# Patient Record
Sex: FEMALE | Race: WHITE | NOT HISPANIC OR LATINO | Employment: FULL TIME | URBAN - METROPOLITAN AREA
[De-identification: names, ages, dates, MRNs, and addresses within clinical notes are randomized per-mention and may not be internally consistent; named-entity substitution may affect disease eponyms.]

---

## 2019-05-29 ENCOUNTER — OFFICE VISIT (OUTPATIENT)
Dept: FAMILY MEDICINE CLINIC | Facility: CLINIC | Age: 44
End: 2019-05-29
Payer: COMMERCIAL

## 2019-05-29 VITALS
BODY MASS INDEX: 28.85 KG/M2 | OXYGEN SATURATION: 97 % | WEIGHT: 169 LBS | DIASTOLIC BLOOD PRESSURE: 72 MMHG | HEIGHT: 64 IN | HEART RATE: 80 BPM | SYSTOLIC BLOOD PRESSURE: 124 MMHG

## 2019-05-29 DIAGNOSIS — I82.402 DEEP VEIN THROMBOSIS (DVT) OF LEFT LOWER EXTREMITY, UNSPECIFIED CHRONICITY, UNSPECIFIED VEIN (HCC): ICD-10-CM

## 2019-05-29 DIAGNOSIS — Z12.31 ENCOUNTER FOR SCREENING MAMMOGRAM FOR BREAST CANCER: Primary | ICD-10-CM

## 2019-05-29 PROCEDURE — 99203 OFFICE O/P NEW LOW 30 MIN: CPT | Performed by: NURSE PRACTITIONER

## 2019-05-29 PROCEDURE — 3008F BODY MASS INDEX DOCD: CPT | Performed by: NURSE PRACTITIONER

## 2019-06-11 ENCOUNTER — TELEPHONE (OUTPATIENT)
Dept: FAMILY MEDICINE CLINIC | Facility: CLINIC | Age: 44
End: 2019-06-11

## 2019-06-11 DIAGNOSIS — Z12.31 ENCOUNTER FOR SCREENING MAMMOGRAM FOR BREAST CANCER: ICD-10-CM

## 2019-06-11 DIAGNOSIS — N63.0 BREAST MASS: Primary | ICD-10-CM

## 2019-06-12 DIAGNOSIS — N63.0 BREAST MASS: ICD-10-CM

## 2019-07-02 ENCOUNTER — TELEPHONE (OUTPATIENT)
Dept: FAMILY MEDICINE CLINIC | Facility: CLINIC | Age: 44
End: 2019-07-02

## 2019-07-02 NOTE — TELEPHONE ENCOUNTER
Patient is waiting to hear from [facility]    Naa Raissa just received disk of images   Spoke to Diana she is calling patient to set up appointments for what is needed as follow up

## 2019-07-03 ENCOUNTER — HOSPITAL ENCOUNTER (OUTPATIENT)
Dept: MAMMOGRAPHY | Facility: CLINIC | Age: 44
Discharge: HOME | End: 2019-07-03

## 2019-07-03 ENCOUNTER — TELEPHONE (OUTPATIENT)
Dept: FAMILY MEDICINE CLINIC | Facility: CLINIC | Age: 44
End: 2019-07-03

## 2019-07-03 DIAGNOSIS — Z76.89 REFERRAL OF PATIENT WITHOUT EXAMINATION OR TREATMENT: ICD-10-CM

## 2019-07-03 NOTE — TELEPHONE ENCOUNTER
Outside films were looked at by our regional breast      They placed a note in the images tab for you  We are to let them know how you wish to proceed with the testing   They have not yet called the patient until you see the updated report    Thank you

## 2019-07-03 NOTE — TELEPHONE ENCOUNTER
There are two different reports - previous one it seems states to have a biopsy and the other states to recheck in 6 months - possible cystic lesions  We can ask her what she would like to do - I would recommend a biopsy to err on the side of caution as there are two conflicting options and I have only seen her once for an ED follow-up

## 2019-07-03 NOTE — PROGRESS NOTES
Spoke with pt regarding an outside review recommendation of Dr Timbo Pradhan  for cyst aspiration of right breast with potential for biopsy if needed  __x___ RIGHT ______LEFT      _____Ultrasound guided  ______Stereotactic  Breast biopsy ___x____Cyst Aspiration      __x___Verbalized understanding        Blood thinners:  __x___yes _____no  [place])    Date stopped: ___n/a________    Biopsy teaching sheet reviewed with pt via telephone:  ___x____yes ______no

## 2019-07-11 ENCOUNTER — HOSPITAL ENCOUNTER (OUTPATIENT)
Dept: ULTRASOUND IMAGING | Facility: CLINIC | Age: 44
Discharge: HOME | End: 2019-07-11
Payer: COMMERCIAL

## 2019-07-11 VITALS
HEART RATE: 72 BPM | SYSTOLIC BLOOD PRESSURE: 122 MMHG | BODY MASS INDEX: 28.85 KG/M2 | WEIGHT: 169 LBS | DIASTOLIC BLOOD PRESSURE: 76 MMHG | HEIGHT: 64 IN

## 2019-07-11 DIAGNOSIS — R92.8 ABNORMAL ULTRASOUND OF BREAST: ICD-10-CM

## 2019-07-11 PROCEDURE — 10160 PNXR ASPIR ABSC HMTMA BULLA: CPT

## 2019-07-11 PROCEDURE — 76942 ECHO GUIDE FOR BIOPSY: CPT

## 2019-07-11 RX ORDER — LIDOCAINE HYDROCHLORIDE 10 MG/ML
4 INJECTION, SOLUTION EPIDURAL; INFILTRATION; INTRACAUDAL; PERINEURAL ONCE
Status: COMPLETED | OUTPATIENT
Start: 2019-07-11 | End: 2019-07-11

## 2019-07-11 RX ADMIN — LIDOCAINE HYDROCHLORIDE 4 ML: 10 INJECTION, SOLUTION EPIDURAL; INFILTRATION; INTRACAUDAL; PERINEURAL at 10:59

## 2019-07-11 NOTE — DISCHARGE INSTR - OTHER ORDERS
POST LARGE CORE BREAST BIOPSY PATIENT INFORMATION      1  Place an ice pack inside your bra over the top of the dressing every hour for 20 minutes (20 minutes on, 60 minutes off)  Do this until bedtime  2  Do not shower or bathe until the following morning  3  You may bathe your breast carefully with the steri-strips in place  Be careful    Not to loosen them  The steri-strips will fall off in 3-5 days  4  You may have mild discomfort, and you may have some bruising where the   Needle entered the skin  This should clear within 5-7 days  5  If you need medicine for discomfort, take acetaminophen products such as   Tylenol  You may also take Advil or Motrin products  6  Do not participate in strenuous activities such as-tennis, aerobics, skiing,  Weight lifting, etc  for 24 hours  Refrain from swimming/soaking for 72 hours  7  Wearing a bra for sleeping may be more comfortable for the first 24-48 hours  8  Watch for continued bleeding, pain or fever over 101; please call with any questions or concerns  For procedures done at the [facility]  [address] call:  Praveen Manrique RN at [phone]  Erenest Curling RN at [phone]                    *After 4 PM call the Interventional Radiology Department                    [phone] and ask to speak with the nurse on call  For procedures done at the [address] [place] [address] call:         Zachary Spatz RN at   *After 4 PM call the Interventional Radiology Department   [phone] and ask to speak with the nurse on call  For procedures done at [address] [place] [address] call: The Radiology Nurse at [phone]  *After 4 PM call your physician, or go to the Emergency Department  9          The final results of your biopsy are usually available within one week

## 2019-07-11 NOTE — PROGRESS NOTES
Procedure type:    ____x_ultrasound guided _____stereotactic    Breast:    _____Left ___x__Right    Location:[address]    Needle:18ga Precision    # of passes: 1    Clip: N/A    Performed by: Dr Alberto Frederick held for 5 minutes by:  Kamari PeñalozaPCA)    Steri Strips:    _____yes ___X__no    Band aid:    __X___yes_____no    Tape and guaze:    _____yes __X___no    Tolerated procedure:    __X___yes _____no

## 2019-07-11 NOTE — PROGRESS NOTES
Ice pack given:    __X___yes _____no    Discharge instructions signed by patient:    ___X__yes _____no    Discharge instructions given to patient:    ____X_yes _____no    Discharged via:    __X___amulatory    _____wheelchair    _____stretcher    Stable on discharge:    ___X__yes ____no

## 2019-07-12 NOTE — PROGRESS NOTES
Post procedure call completed on 7/12/19 at 1535    Bleeding: _____yes __x___no    Pain: _____yes ___x___no    Redness/Swelling: ______yes __x____no    Band aid removed: __x___yes _____no    Steri-Strips intact: ______yes _____no___x__  N/A

## 2019-07-29 ENCOUNTER — OFFICE VISIT (OUTPATIENT)
Dept: FAMILY MEDICINE CLINIC | Facility: CLINIC | Age: 44
End: 2019-07-29
Payer: COMMERCIAL

## 2019-07-29 VITALS
HEIGHT: 64 IN | HEART RATE: 70 BPM | OXYGEN SATURATION: 97 % | DIASTOLIC BLOOD PRESSURE: 74 MMHG | WEIGHT: 167.4 LBS | BODY MASS INDEX: 28.58 KG/M2 | SYSTOLIC BLOOD PRESSURE: 120 MMHG

## 2019-07-29 DIAGNOSIS — Z00.00 ANNUAL PHYSICAL EXAM: Primary | ICD-10-CM

## 2019-07-29 DIAGNOSIS — M25.842 CYST OF JOINT OF LEFT HAND: ICD-10-CM

## 2019-07-29 DIAGNOSIS — I82.402 DEEP VEIN THROMBOSIS (DVT) OF LEFT LOWER EXTREMITY, UNSPECIFIED CHRONICITY, UNSPECIFIED VEIN (HCC): ICD-10-CM

## 2019-07-29 PROCEDURE — 99396 PREV VISIT EST AGE 40-64: CPT | Performed by: NURSE PRACTITIONER

## 2019-07-29 NOTE — PROGRESS NOTES
ADULT ANNUAL PHYSICAL  [facility]    NAME: Neal Light  AGE: 38 y o  SEX: female  : 1975     DATE: 2019     Assessment and Plan:     Problem List Items Addressed This Visit     None      Visit Diagnoses     Annual physical exam    -  Primary    BMI 28 0-28 9,adult              Immunizations and preventive care screenings were discussed with patient today  Appropriate education was printed on patient's after visit summary  Venous duplex ordered to re-evaluate for presence of left lower extremity DVT  Counseling:  · Dental Health: discussed importance of regular tooth brushing, flossing, and dental visits  Return in 6 months (on 2020)  Chief Complaint:     Chief Complaint   Patient presents with    Follow-up    Annual Exam      History of Present Illness:     Adult Annual Physical   Patient here for a comprehensive physical exam  The patient reports hx of her second DVT - currently on Xarelto, also with hx of one superficial DVT  Ferol Duncan Diet and Physical Activity  · Diet/Nutrition: well balanced diet  · Exercise: 1-2 times a week on average  Depression Screening  PHQ-9 Depression Screening    PHQ-9:    Frequency of the following problems over the past two weeks:            General Health  · Sleep: sleeps well  · Hearing: normal - bilateral   · Vision: no vision problems  · Dental: brushes teeth twice daily  /GYN Health  · Patient is: premenopausal  · Last menstrual period: monthly, has been bleeding more often since being on the Xarelto  · Contraceptive method: none  Review of Systems:     Review of Systems   Constitutional: Negative  Respiratory: Negative  Cardiovascular: Negative  Musculoskeletal:        Does have some intermittent swelling of her left lower extremity         Past Medical History:     Past Medical History:   Diagnosis Date    Deep vein blood clot of left lower extremity ([address] )  Past Surgical History:     Past Surgical History:   Procedure Laterality Date    TUBAL LIGATION  1998     BREAST CYST ASPIRATION RIGHT INITIAL Right 7/11/2019      Social History:     Social History     Socioeconomic History    Marital status: Legally      Spouse name: None    Number of children: None    Years of education: None    Highest education level: None   Occupational History    None   Social Needs    Financial resource strain: None    Food insecurity:     Worry: None     Inability: None    Transportation needs:     Medical: None     Non-medical: None   Tobacco Use    Smoking status: Current Every Day Smoker     Packs/day: 1 00     Years: 20 00     Pack years: 20 00    Smokeless tobacco: Never Used   Substance and Sexual Activity    Alcohol use: Yes     Frequency: Monthly or less    Drug use: Not Currently    Sexual activity: None   Lifestyle    Physical activity:     Days per week: None     Minutes per session: None    Stress: None   Relationships    Social connections:     Talks on phone: None     Gets together: None     Attends Protestant service: None     Active member of club or organization: None     Attends meetings of clubs or organizations: None     Relationship status: None    Intimate partner violence:     Fear of current or ex partner: None     Emotionally abused: None     Physically abused: None     Forced sexual activity: None   Other Topics Concern    None   Social History Narrative    None      Family History:     Family History   Problem Relation Age of Onset    Edema Father     Stroke Maternal Grandfather     Colon cancer Maternal Uncle     Skin cancer Mother     No Known Problems Sister     No Known Problems Daughter     No Known Problems Maternal Grandmother     No Known Problems Paternal Grandmother     No Known Problems Paternal Aunt     No Known Problems Paternal Aunt     No Known Problems Paternal Aunt       Current Medications:     Current Outpatient Medications   Medication Sig Dispense Refill    rivaroxaban (XARELTO) 15 mg tablet Take 1 tablet (15 mg total) by mouth daily with breakfast 30 tablet 2     No current facility-administered medications for this visit  Allergies:     No Known Allergies   Physical Exam:     /74 (BP Location: Left arm, Patient Position: Sitting, Cuff Size: Standard)   Pulse 70   Ht 5' 4" (1 626 m)   Wt 75 9 kg (167 lb 6 4 oz)   LMP 07/09/2019 (Exact Date)   SpO2 97%   BMI 28 73 kg/m²     Physical Exam   Constitutional: She is oriented to person, place, and time  She appears well-developed and well-nourished  Cardiovascular: Normal rate, regular rhythm and normal heart sounds  Pulmonary/Chest: Effort normal and breath sounds normal  No respiratory distress  Neurological: She is alert and oriented to person, place, and time  Vitals reviewed  GIOVANI Freitas  [facility]  BMI Counseling: Body mass index is 28 73 kg/m²  Discussed the patient's BMI with her  The BMI is above average  BMI counseling and education was provided to the patient  Exercise recommendations include exercising 3-5 times per week

## 2019-07-29 NOTE — PATIENT INSTRUCTIONS

## 2019-08-22 DIAGNOSIS — I82.402 DEEP VEIN THROMBOSIS (DVT) OF LEFT LOWER EXTREMITY, UNSPECIFIED CHRONICITY, UNSPECIFIED VEIN (HCC): ICD-10-CM

## 2019-08-22 RX ORDER — RIVAROXABAN 15 MG/1
TABLET, FILM COATED ORAL
Qty: 30 TABLET | Refills: 2 | Status: SHIPPED | OUTPATIENT
Start: 2019-08-22 | End: 2019-11-22 | Stop reason: SDUPTHER

## 2019-11-22 DIAGNOSIS — I82.402 DEEP VEIN THROMBOSIS (DVT) OF LEFT LOWER EXTREMITY, UNSPECIFIED CHRONICITY, UNSPECIFIED VEIN (HCC): ICD-10-CM

## 2019-11-22 RX ORDER — RIVAROXABAN 15 MG/1
TABLET, FILM COATED ORAL
Qty: 30 TABLET | Refills: 2 | Status: SHIPPED | OUTPATIENT
Start: 2019-11-22 | End: 2020-02-24

## 2019-12-06 ENCOUNTER — TRANSCRIBE ORDERS (OUTPATIENT)
Dept: ADMINISTRATIVE | Facility: HOSPITAL | Age: 44
End: 2019-12-06

## 2019-12-06 ENCOUNTER — OFFICE VISIT (OUTPATIENT)
Dept: FAMILY MEDICINE CLINIC | Facility: CLINIC | Age: 44
End: 2019-12-06
Payer: COMMERCIAL

## 2019-12-06 ENCOUNTER — APPOINTMENT (OUTPATIENT)
Dept: LAB | Facility: CLINIC | Age: 44
End: 2019-12-06
Payer: COMMERCIAL

## 2019-12-06 VITALS
HEIGHT: 64 IN | HEART RATE: 75 BPM | SYSTOLIC BLOOD PRESSURE: 120 MMHG | OXYGEN SATURATION: 97 % | DIASTOLIC BLOOD PRESSURE: 72 MMHG | BODY MASS INDEX: 29.24 KG/M2 | WEIGHT: 171.3 LBS

## 2019-12-06 DIAGNOSIS — Z11.4 SCREENING FOR HIV (HUMAN IMMUNODEFICIENCY VIRUS): ICD-10-CM

## 2019-12-06 DIAGNOSIS — I82.402 DEEP VEIN THROMBOSIS (DVT) OF LEFT LOWER EXTREMITY, UNSPECIFIED CHRONICITY, UNSPECIFIED VEIN (HCC): ICD-10-CM

## 2019-12-06 DIAGNOSIS — Z00.00 WELLNESS EXAMINATION: ICD-10-CM

## 2019-12-06 DIAGNOSIS — Z00.00 WELLNESS EXAMINATION: Primary | ICD-10-CM

## 2019-12-06 LAB
ALBUMIN SERPL BCP-MCNC: 4.2 G/DL (ref 3.5–5)
ALP SERPL-CCNC: 54 U/L (ref 46–116)
ALT SERPL W P-5'-P-CCNC: 25 U/L (ref 12–78)
ANION GAP SERPL CALCULATED.3IONS-SCNC: 3 MMOL/L (ref 4–13)
AST SERPL W P-5'-P-CCNC: 13 U/L (ref 5–45)
BASOPHILS # BLD AUTO: 0.08 THOUSANDS/ΜL (ref 0–0.1)
BASOPHILS NFR BLD AUTO: 1 % (ref 0–1)
BILIRUB SERPL-MCNC: 0.34 MG/DL (ref 0.2–1)
BUN SERPL-MCNC: 9 MG/DL (ref 5–25)
CALCIUM SERPL-MCNC: 9.3 MG/DL (ref 8.3–10.1)
CHLORIDE SERPL-SCNC: 107 MMOL/L (ref 100–108)
CO2 SERPL-SCNC: 30 MMOL/L (ref 21–32)
CREAT SERPL-MCNC: 0.82 MG/DL (ref 0.6–1.3)
EOSINOPHIL # BLD AUTO: 0.15 THOUSAND/ΜL (ref 0–0.61)
EOSINOPHIL NFR BLD AUTO: 2 % (ref 0–6)
ERYTHROCYTE [DISTWIDTH] IN BLOOD BY AUTOMATED COUNT: 13 % (ref 11.6–15.1)
GFR SERPL CREATININE-BSD FRML MDRD: 87 ML/MIN/1.73SQ M
GLUCOSE P FAST SERPL-MCNC: 81 MG/DL (ref 65–99)
HCT VFR BLD AUTO: 48.5 % (ref 34.8–46.1)
HGB BLD-MCNC: 15.9 G/DL (ref 11.5–15.4)
IMM GRANULOCYTES # BLD AUTO: 0.03 THOUSAND/UL (ref 0–0.2)
IMM GRANULOCYTES NFR BLD AUTO: 0 % (ref 0–2)
LYMPHOCYTES # BLD AUTO: 2.54 THOUSANDS/ΜL (ref 0.6–4.47)
LYMPHOCYTES NFR BLD AUTO: 32 % (ref 14–44)
MCH RBC QN AUTO: 32 PG (ref 26.8–34.3)
MCHC RBC AUTO-ENTMCNC: 32.8 G/DL (ref 31.4–37.4)
MCV RBC AUTO: 98 FL (ref 82–98)
MONOCYTES # BLD AUTO: 0.67 THOUSAND/ΜL (ref 0.17–1.22)
MONOCYTES NFR BLD AUTO: 9 % (ref 4–12)
NEUTROPHILS # BLD AUTO: 4.44 THOUSANDS/ΜL (ref 1.85–7.62)
NEUTS SEG NFR BLD AUTO: 56 % (ref 43–75)
NRBC BLD AUTO-RTO: 0 /100 WBCS
PLATELET # BLD AUTO: 210 THOUSANDS/UL (ref 149–390)
PMV BLD AUTO: 11.1 FL (ref 8.9–12.7)
POTASSIUM SERPL-SCNC: 4.3 MMOL/L (ref 3.5–5.3)
PROT SERPL-MCNC: 7.3 G/DL (ref 6.4–8.2)
RBC # BLD AUTO: 4.97 MILLION/UL (ref 3.81–5.12)
SODIUM SERPL-SCNC: 140 MMOL/L (ref 136–145)
WBC # BLD AUTO: 7.91 THOUSAND/UL (ref 4.31–10.16)

## 2019-12-06 PROCEDURE — 87389 HIV-1 AG W/HIV-1&-2 AB AG IA: CPT

## 2019-12-06 PROCEDURE — 80053 COMPREHEN METABOLIC PANEL: CPT

## 2019-12-06 PROCEDURE — 4004F PT TOBACCO SCREEN RCVD TLK: CPT | Performed by: NURSE PRACTITIONER

## 2019-12-06 PROCEDURE — 36415 COLL VENOUS BLD VENIPUNCTURE: CPT

## 2019-12-06 PROCEDURE — 3008F BODY MASS INDEX DOCD: CPT | Performed by: NURSE PRACTITIONER

## 2019-12-06 PROCEDURE — 99213 OFFICE O/P EST LOW 20 MIN: CPT | Performed by: NURSE PRACTITIONER

## 2019-12-06 PROCEDURE — 85025 COMPLETE CBC W/AUTO DIFF WBC: CPT

## 2019-12-06 NOTE — PATIENT INSTRUCTIONS
You may receive a survey in the mail, or by e-mail, please fill it out, and let us know how we did! Thank you again for choosing [facility]!

## 2019-12-06 NOTE — PROGRESS NOTES
Tobacco Cessation Counseling: Tobacco cessation counseling was not provided  The patient is sincerely urged to quit consumption of tobacco  She is not ready to quit tobacco      Assessment/Plan:       Diagnoses and all orders for this visit:    Wellness examination  -     Comprehensive metabolic panel; Future  -     CBC and differential; Future    Screening for HIV (human immunodeficiency virus)  -     Human Immunodeficiency Virus 1/2 Antigen / Antibody ( Fourth Generation) with Reflex Testing; Future      Blood work ordered to check platelets, blood count, liver enzymes  HIV screening to be completed  Subjective:      Patient ID: Jyoti Rodas is a 38 y o  female  Here today for a follow-up  She is with a hx of multiple DVT's now currently on Xarelto daily  Denies any bleeding issues, chest pain, SOB, or leg swelling  Had her flu vaccine through her employer  No new complaints today  The following portions of the patient's history were reviewed and updated as appropriate: allergies, current medications, past family history, past medical history, past social history, past surgical history and problem list     Review of Systems   Constitutional: Negative  Respiratory: Negative  Cardiovascular: Negative  Neurological: Negative  All other systems reviewed and are negative  Objective:      /72 (BP Location: Right arm, Patient Position: Sitting, Cuff Size: Standard)   Pulse 75   Ht 5' 4" (1 626 m)   Wt 77 7 kg (171 lb 4 8 oz)   SpO2 97%   BMI 29 40 kg/m²          Physical Exam   Constitutional: She is oriented to person, place, and time  She appears well-developed and well-nourished  HENT:   Head: Normocephalic and atraumatic  Cardiovascular: Normal rate, regular rhythm and normal heart sounds  Exam reveals no gallop and no friction rub  No murmur heard  Pulmonary/Chest: Effort normal and breath sounds normal  No respiratory distress  She has no wheezes  Neurological: She is alert and oriented to person, place, and time  Psychiatric: She has a normal mood and affect  Her behavior is normal  Thought content normal    Vitals reviewed

## 2019-12-09 LAB — HIV 1+2 AB+HIV1 P24 AG SERPL QL IA: NORMAL

## 2020-02-24 DIAGNOSIS — I82.402 DEEP VEIN THROMBOSIS (DVT) OF LEFT LOWER EXTREMITY, UNSPECIFIED CHRONICITY, UNSPECIFIED VEIN (HCC): ICD-10-CM

## 2020-02-24 RX ORDER — RIVAROXABAN 15 MG/1
TABLET, FILM COATED ORAL
Qty: 30 TABLET | Refills: 2 | Status: SHIPPED | OUTPATIENT
Start: 2020-02-24 | End: 2020-05-26

## 2020-05-24 DIAGNOSIS — I82.402 DEEP VEIN THROMBOSIS (DVT) OF LEFT LOWER EXTREMITY, UNSPECIFIED CHRONICITY, UNSPECIFIED VEIN (HCC): ICD-10-CM

## 2020-05-26 RX ORDER — RIVAROXABAN 15 MG/1
TABLET, FILM COATED ORAL
Qty: 30 TABLET | Refills: 2 | Status: SHIPPED | OUTPATIENT
Start: 2020-05-26 | End: 2020-08-24

## 2020-06-11 ENCOUNTER — TELEPHONE (OUTPATIENT)
Dept: FAMILY MEDICINE CLINIC | Facility: CLINIC | Age: 45
End: 2020-06-11

## 2020-06-15 ENCOUNTER — OFFICE VISIT (OUTPATIENT)
Dept: FAMILY MEDICINE CLINIC | Facility: CLINIC | Age: 45
End: 2020-06-15
Payer: COMMERCIAL

## 2020-06-15 VITALS
OXYGEN SATURATION: 98 % | DIASTOLIC BLOOD PRESSURE: 72 MMHG | SYSTOLIC BLOOD PRESSURE: 120 MMHG | TEMPERATURE: 97.6 F | WEIGHT: 177.6 LBS | HEIGHT: 64 IN | BODY MASS INDEX: 30.32 KG/M2 | HEART RATE: 67 BPM

## 2020-06-15 DIAGNOSIS — Z92.29 H/O LONG-TERM (CURRENT) USE OF ANTICOAGULANTS: ICD-10-CM

## 2020-06-15 DIAGNOSIS — I82.402 DEEP VEIN THROMBOSIS (DVT) OF LEFT LOWER EXTREMITY, UNSPECIFIED CHRONICITY, UNSPECIFIED VEIN (HCC): Primary | ICD-10-CM

## 2020-06-15 DIAGNOSIS — Z12.31 ENCOUNTER FOR SCREENING MAMMOGRAM FOR BREAST CANCER: ICD-10-CM

## 2020-06-15 PROCEDURE — 99213 OFFICE O/P EST LOW 20 MIN: CPT | Performed by: NURSE PRACTITIONER

## 2020-06-15 PROCEDURE — 3008F BODY MASS INDEX DOCD: CPT | Performed by: NURSE PRACTITIONER

## 2020-07-27 DIAGNOSIS — Z12.31 ENCOUNTER FOR SCREENING MAMMOGRAM FOR BREAST CANCER: ICD-10-CM

## 2020-07-28 ENCOUNTER — TELEPHONE (OUTPATIENT)
Dept: FAMILY MEDICINE CLINIC | Facility: CLINIC | Age: 45
End: 2020-07-28

## 2020-07-28 NOTE — TELEPHONE ENCOUNTER
Can we please call Eboni Frost and make her aware that her mammogram was negative - she is good for one year

## 2020-08-23 DIAGNOSIS — I82.402 DEEP VEIN THROMBOSIS (DVT) OF LEFT LOWER EXTREMITY, UNSPECIFIED CHRONICITY, UNSPECIFIED VEIN (HCC): ICD-10-CM

## 2020-08-24 RX ORDER — RIVAROXABAN 15 MG/1
TABLET, FILM COATED ORAL
Qty: 30 TABLET | Refills: 2 | Status: SHIPPED | OUTPATIENT
Start: 2020-08-24 | End: 2020-11-25

## 2020-11-25 DIAGNOSIS — I82.402 DEEP VEIN THROMBOSIS (DVT) OF LEFT LOWER EXTREMITY, UNSPECIFIED CHRONICITY, UNSPECIFIED VEIN (HCC): ICD-10-CM

## 2020-11-25 RX ORDER — RIVAROXABAN 15 MG/1
TABLET, FILM COATED ORAL
Qty: 30 TABLET | Refills: 2 | Status: SHIPPED | OUTPATIENT
Start: 2020-11-25 | End: 2021-02-18

## 2020-12-14 ENCOUNTER — OFFICE VISIT (OUTPATIENT)
Dept: FAMILY MEDICINE CLINIC | Facility: CLINIC | Age: 45
End: 2020-12-14
Payer: COMMERCIAL

## 2020-12-14 VITALS
HEIGHT: 64 IN | BODY MASS INDEX: 30.49 KG/M2 | DIASTOLIC BLOOD PRESSURE: 74 MMHG | TEMPERATURE: 97.5 F | SYSTOLIC BLOOD PRESSURE: 128 MMHG | WEIGHT: 178.6 LBS | OXYGEN SATURATION: 98 % | HEART RATE: 76 BPM

## 2020-12-14 DIAGNOSIS — Z00.00 ANNUAL PHYSICAL EXAM: Primary | ICD-10-CM

## 2020-12-14 DIAGNOSIS — I82.402 DEEP VEIN THROMBOSIS (DVT) OF LEFT LOWER EXTREMITY, UNSPECIFIED CHRONICITY, UNSPECIFIED VEIN (HCC): ICD-10-CM

## 2020-12-14 PROCEDURE — 3008F BODY MASS INDEX DOCD: CPT | Performed by: NURSE PRACTITIONER

## 2020-12-14 PROCEDURE — 99396 PREV VISIT EST AGE 40-64: CPT | Performed by: NURSE PRACTITIONER

## 2020-12-14 PROCEDURE — 4004F PT TOBACCO SCREEN RCVD TLK: CPT | Performed by: NURSE PRACTITIONER

## 2020-12-14 PROCEDURE — 3725F SCREEN DEPRESSION PERFORMED: CPT | Performed by: NURSE PRACTITIONER

## 2020-12-16 ENCOUNTER — APPOINTMENT (OUTPATIENT)
Dept: LAB | Facility: HOSPITAL | Age: 45
End: 2020-12-16
Payer: COMMERCIAL

## 2020-12-16 DIAGNOSIS — Z00.00 ANNUAL PHYSICAL EXAM: ICD-10-CM

## 2020-12-16 LAB
ALBUMIN SERPL BCP-MCNC: 3.6 G/DL (ref 3.5–5)
ALP SERPL-CCNC: 57 U/L (ref 46–116)
ALT SERPL W P-5'-P-CCNC: 20 U/L (ref 12–78)
ANION GAP SERPL CALCULATED.3IONS-SCNC: 7 MMOL/L (ref 4–13)
AST SERPL W P-5'-P-CCNC: 13 U/L (ref 5–45)
BILIRUB SERPL-MCNC: 0.44 MG/DL (ref 0.2–1)
BUN SERPL-MCNC: 12 MG/DL (ref 5–25)
CALCIUM SERPL-MCNC: 8.9 MG/DL (ref 8.3–10.1)
CHLORIDE SERPL-SCNC: 102 MMOL/L (ref 100–108)
CHOLEST SERPL-MCNC: 168 MG/DL (ref 50–200)
CO2 SERPL-SCNC: 28 MMOL/L (ref 21–32)
CREAT SERPL-MCNC: 1.06 MG/DL (ref 0.6–1.3)
GFR SERPL CREATININE-BSD FRML MDRD: 63 ML/MIN/1.73SQ M
GLUCOSE P FAST SERPL-MCNC: 112 MG/DL (ref 65–99)
HDLC SERPL-MCNC: 48 MG/DL
LDLC SERPL CALC-MCNC: 102 MG/DL (ref 0–100)
NONHDLC SERPL-MCNC: 120 MG/DL
POTASSIUM SERPL-SCNC: 4.2 MMOL/L (ref 3.5–5.3)
PROT SERPL-MCNC: 7.1 G/DL (ref 6.4–8.2)
SODIUM SERPL-SCNC: 137 MMOL/L (ref 136–145)
TRIGL SERPL-MCNC: 88 MG/DL

## 2020-12-16 PROCEDURE — 80061 LIPID PANEL: CPT

## 2020-12-16 PROCEDURE — 36415 COLL VENOUS BLD VENIPUNCTURE: CPT

## 2020-12-16 PROCEDURE — 80053 COMPREHEN METABOLIC PANEL: CPT

## 2021-02-18 DIAGNOSIS — I82.402 DEEP VEIN THROMBOSIS (DVT) OF LEFT LOWER EXTREMITY, UNSPECIFIED CHRONICITY, UNSPECIFIED VEIN (HCC): ICD-10-CM

## 2021-02-18 RX ORDER — RIVAROXABAN 15 MG/1
TABLET, FILM COATED ORAL
Qty: 30 TABLET | Refills: 2 | Status: SHIPPED | OUTPATIENT
Start: 2021-02-18 | End: 2021-04-26 | Stop reason: SDUPTHER

## 2021-04-12 DIAGNOSIS — Z23 ENCOUNTER FOR IMMUNIZATION: ICD-10-CM

## 2021-04-22 ENCOUNTER — IMMUNIZATIONS (OUTPATIENT)
Dept: FAMILY MEDICINE CLINIC | Facility: HOSPITAL | Age: 46
End: 2021-04-22

## 2021-04-22 DIAGNOSIS — Z23 ENCOUNTER FOR IMMUNIZATION: Primary | ICD-10-CM

## 2021-04-22 PROCEDURE — 91300 SARS-COV-2 / COVID-19 MRNA VACCINE (PFIZER-BIONTECH) 30 MCG: CPT

## 2021-04-22 PROCEDURE — 0001A SARS-COV-2 / COVID-19 MRNA VACCINE (PFIZER-BIONTECH) 30 MCG: CPT

## 2021-04-26 DIAGNOSIS — I82.402 DEEP VEIN THROMBOSIS (DVT) OF LEFT LOWER EXTREMITY, UNSPECIFIED CHRONICITY, UNSPECIFIED VEIN (HCC): ICD-10-CM

## 2021-05-21 ENCOUNTER — IMMUNIZATIONS (OUTPATIENT)
Dept: FAMILY MEDICINE CLINIC | Facility: HOSPITAL | Age: 46
End: 2021-05-21

## 2021-05-21 DIAGNOSIS — Z23 ENCOUNTER FOR IMMUNIZATION: Primary | ICD-10-CM

## 2021-05-21 PROCEDURE — 0002A SARS-COV-2 / COVID-19 MRNA VACCINE (PFIZER-BIONTECH) 30 MCG: CPT

## 2021-05-21 PROCEDURE — 91300 SARS-COV-2 / COVID-19 MRNA VACCINE (PFIZER-BIONTECH) 30 MCG: CPT

## 2021-06-14 ENCOUNTER — APPOINTMENT (OUTPATIENT)
Dept: RADIOLOGY | Facility: CLINIC | Age: 46
End: 2021-06-14
Payer: COMMERCIAL

## 2021-06-14 ENCOUNTER — TRANSCRIBE ORDERS (OUTPATIENT)
Dept: ADMINISTRATIVE | Facility: HOSPITAL | Age: 46
End: 2021-06-14

## 2021-06-14 ENCOUNTER — OFFICE VISIT (OUTPATIENT)
Dept: FAMILY MEDICINE CLINIC | Facility: CLINIC | Age: 46
End: 2021-06-14
Payer: COMMERCIAL

## 2021-06-14 VITALS
BODY MASS INDEX: 30.66 KG/M2 | HEIGHT: 64 IN | OXYGEN SATURATION: 97 % | HEART RATE: 71 BPM | DIASTOLIC BLOOD PRESSURE: 64 MMHG | TEMPERATURE: 98.2 F | SYSTOLIC BLOOD PRESSURE: 118 MMHG | WEIGHT: 179.6 LBS

## 2021-06-14 DIAGNOSIS — M79.89 SWELLING OF LOWER LEG: ICD-10-CM

## 2021-06-14 DIAGNOSIS — M79.671 PAIN OF BOTH HEELS: Primary | ICD-10-CM

## 2021-06-14 DIAGNOSIS — F17.210 CIGARETTE NICOTINE DEPENDENCE WITHOUT COMPLICATION: ICD-10-CM

## 2021-06-14 DIAGNOSIS — M79.672 PAIN OF BOTH HEELS: ICD-10-CM

## 2021-06-14 DIAGNOSIS — Z71.6 ENCOUNTER FOR SMOKING CESSATION COUNSELING: ICD-10-CM

## 2021-06-14 DIAGNOSIS — M79.671 PAIN OF BOTH HEELS: ICD-10-CM

## 2021-06-14 DIAGNOSIS — Z12.31 ENCOUNTER FOR SCREENING MAMMOGRAM FOR BREAST CANCER: ICD-10-CM

## 2021-06-14 DIAGNOSIS — Z12.4 SCREENING FOR CERVICAL CANCER: ICD-10-CM

## 2021-06-14 DIAGNOSIS — M79.672 PAIN OF BOTH HEELS: Primary | ICD-10-CM

## 2021-06-14 DIAGNOSIS — I82.402 DEEP VEIN THROMBOSIS (DVT) OF LEFT LOWER EXTREMITY, UNSPECIFIED CHRONICITY, UNSPECIFIED VEIN (HCC): ICD-10-CM

## 2021-06-14 PROCEDURE — 99406 BEHAV CHNG SMOKING 3-10 MIN: CPT | Performed by: NURSE PRACTITIONER

## 2021-06-14 PROCEDURE — 73630 X-RAY EXAM OF FOOT: CPT

## 2021-06-14 PROCEDURE — 4004F PT TOBACCO SCREEN RCVD TLK: CPT | Performed by: NURSE PRACTITIONER

## 2021-06-14 PROCEDURE — 99214 OFFICE O/P EST MOD 30 MIN: CPT | Performed by: NURSE PRACTITIONER

## 2021-06-14 PROCEDURE — 3725F SCREEN DEPRESSION PERFORMED: CPT | Performed by: NURSE PRACTITIONER

## 2021-06-14 PROCEDURE — 3008F BODY MASS INDEX DOCD: CPT | Performed by: NURSE PRACTITIONER

## 2021-06-14 RX ORDER — VARENICLINE TARTRATE 25 MG
KIT ORAL
Qty: 53 TABLET | Refills: 0 | Status: SHIPPED | OUTPATIENT
Start: 2021-06-14 | End: 2021-08-09

## 2021-06-14 NOTE — PROGRESS NOTES
Assessment/Plan:       Diagnoses and all orders for this visit:    Pain of both heels  -     XR foot 3+ vw right; Future  -     XR foot 3+ vw left; Future    Swelling of lower leg  -     XR foot 3+ vw right; Future  -     XR foot 3+ vw left; Future    Deep vein thrombosis (DVT) of left lower extremity, unspecified chronicity, unspecified vein (HCC)    Screening for cervical cancer  -     Ambulatory referral to Obstetrics / Gynecology; Future    BMI 30 0-30 9,adult    Encounter for screening mammogram for breast cancer  -     Mammo screening bilateral w 3d & cad; Future    Cigarette nicotine dependence without complication  -     varenicline (CHANTIX DARREL) 0 5 MG X 11 & 1 MG X 42 tablet; Take one 0 5 mg tablet by mouth once daily for 3 days, then one 0 5 mg tablet by mouth twice daily for 4 days, then one 1 mg tablet by mouth twice daily  Encounter for smoking cessation counseling  -     varenicline (CHANTIX DARREL) 0 5 MG X 11 & 1 MG X 42 tablet; Take one 0 5 mg tablet by mouth once daily for 3 days, then one 0 5 mg tablet by mouth twice daily for 4 days, then one 1 mg tablet by mouth twice daily  Other orders  -     Cancel: PNEUMOCOCCAL POLYSACCHARIDE VACCINE 23-VALENT =>3YO SQ IM  -     Cancel: TDAP VACCINE GREATER THAN OR EQUAL TO 8YO IM      Will have her continue with the Xarelto  Suspect arthritis in her left foot due to history of crush injury to that foot - potential heel spur to right heel/foot - will have b/l foot xray's completed  Discussed Chantix and cessation process  BMI Counseling: Body mass index is 30 83 kg/m²  The BMI is above normal  Nutrition recommendations include encouraging healthy choices of fruits and vegetables, consuming healthier snacks, limiting drinks that contain sugar, moderation in carbohydrate intake and reducing intake of saturated and trans fat  Tobacco Cessation Counseling: Tobacco cessation counseling was provided   The patient is sincerely urged to quit consumption of tobacco  She is ready to quit tobacco  Medication options discussed  Varenicline (chantix) was prescribed  Counseling last approximately 5 minutes        Subjective:      Patient ID: Efrain Zuniga is a 51 y o  female  Here today for a follow-up  She is with a more recent past hx of a DVT in her left leg - past hx of a crush injury  She is on Xarelto as she has had more than one DVT  She also reports of swelling in her left leg which is normal due to the injury, but she has had intermittent pain in both her right and left foot  She is also interested in quitting smoking, is currently smoking 1 ppd  The following portions of the patient's history were reviewed and updated as appropriate: allergies, current medications, past family history, past medical history, past social history, past surgical history and problem list     Review of Systems   Musculoskeletal:        Please see HPI for this visit  Objective:      /64 (BP Location: Right arm, Patient Position: Sitting, Cuff Size: Standard)   Pulse 71   Temp 98 2 °F (36 8 °C)   Ht 5' 4" (1 626 m)   Wt 81 5 kg (179 lb 9 6 oz)   SpO2 97%   BMI 30 83 kg/m²          Physical Exam  Vitals reviewed  Constitutional:       Appearance: Normal appearance  HENT:      Head: Normocephalic and atraumatic  Eyes:      Extraocular Movements: Extraocular movements intact  Conjunctiva/sclera: Conjunctivae normal    Cardiovascular:      Rate and Rhythm: Normal rate and regular rhythm  Heart sounds: No murmur heard  No gallop  Pulmonary:      Effort: Pulmonary effort is normal  No respiratory distress  Breath sounds: Normal breath sounds  No wheezing or rales  Musculoskeletal:      Cervical back: Neck supple  Right ankle: No swelling  Right Achilles Tendon: No defects  Left ankle: Swelling present  Left Achilles Tendon: No defects  Right foot: Normal range of motion  No swelling        Left foot: Normal range of motion  Swelling present  Skin:     General: Skin is warm and dry  Neurological:      General: No focal deficit present  Mental Status: She is alert and oriented to person, place, and time  Mental status is at baseline  Psychiatric:         Mood and Affect: Mood normal          Behavior: Behavior normal          Thought Content:  Thought content normal          Judgment: Judgment normal

## 2021-06-14 NOTE — PATIENT INSTRUCTIONS
You may receive a survey in the mail, or by e-mail, please fill it out COMPLETELY, and let us know how we did! Please remember to sign up for your [employer] jennyfer to check you lab results, send us messages, and schedule appointments  If you have questions about the [employer] option, please call us! Thank you again for choosing [facility]!

## 2021-06-24 DIAGNOSIS — M79.89 SWELLING OF LOWER LEG: Primary | ICD-10-CM

## 2021-06-24 RX ORDER — FUROSEMIDE 20 MG/1
20 TABLET ORAL DAILY
Qty: 30 TABLET | Refills: 2 | Status: SHIPPED | OUTPATIENT
Start: 2021-06-24 | End: 2021-09-23 | Stop reason: SDUPTHER

## 2021-07-22 ENCOUNTER — OFFICE VISIT (OUTPATIENT)
Dept: FAMILY MEDICINE CLINIC | Facility: CLINIC | Age: 46
End: 2021-07-22
Payer: COMMERCIAL

## 2021-07-22 VITALS
HEIGHT: 64 IN | HEART RATE: 58 BPM | DIASTOLIC BLOOD PRESSURE: 72 MMHG | OXYGEN SATURATION: 98 % | BODY MASS INDEX: 30.93 KG/M2 | WEIGHT: 181.2 LBS | SYSTOLIC BLOOD PRESSURE: 118 MMHG

## 2021-07-22 DIAGNOSIS — M79.672 PAIN OF BOTH HEELS: Primary | ICD-10-CM

## 2021-07-22 DIAGNOSIS — Z79.899 LONG TERM CURRENT USE OF DIURETIC: ICD-10-CM

## 2021-07-22 DIAGNOSIS — M79.671 PAIN OF BOTH HEELS: Primary | ICD-10-CM

## 2021-07-22 DIAGNOSIS — M79.89 LEG SWELLING: ICD-10-CM

## 2021-07-22 PROCEDURE — 99213 OFFICE O/P EST LOW 20 MIN: CPT | Performed by: NURSE PRACTITIONER

## 2021-07-22 PROCEDURE — 3008F BODY MASS INDEX DOCD: CPT | Performed by: NURSE PRACTITIONER

## 2021-07-22 PROCEDURE — 4004F PT TOBACCO SCREEN RCVD TLK: CPT | Performed by: NURSE PRACTITIONER

## 2021-07-22 NOTE — PROGRESS NOTES
Assessment/Plan:       Diagnoses and all orders for this visit:    Pain of both heels    Leg swelling    Long term current use of diuretic  -     Comprehensive metabolic panel; Future      Continue with current dose of furosemide - will have a CMP drawn to assess potassium level at this time  Continue with medication regimen  Subjective:      Patient ID: Harry Manuel is a 51 y o  female  Here today for a follow-up related to lower leg swelling - hx of a past DVT and trauma to left foot - notes swelling is controlled with daily diuretic  Denies any leg cramps or chest pain  Remains on xarelto for DVT history  The following portions of the patient's history were reviewed and updated as appropriate: allergies, current medications, past family history, past medical history, past social history, past surgical history and problem list     Review of Systems   Constitutional: Negative  Respiratory: Negative  Cardiovascular: Negative  Musculoskeletal:        Please see HPI  All other systems reviewed and are negative  Objective:      /72 (BP Location: Left arm, Patient Position: Sitting, Cuff Size: Standard)   Pulse 58   Ht 5' 4" (1 626 m)   Wt 82 2 kg (181 lb 3 2 oz)   SpO2 98%   BMI 31 10 kg/m²          Physical Exam  Vitals reviewed  Constitutional:       Appearance: Normal appearance  Pulmonary:      Effort: Pulmonary effort is normal  No respiratory distress  Musculoskeletal:      Comments: Slight swelling to left lower leg which is leg that was injured in the past and is always slightly larger than the right  Neurological:      Mental Status: She is alert and oriented to person, place, and time  Psychiatric:         Mood and Affect: Mood normal          Behavior: Behavior normal          Thought Content:  Thought content normal          Judgment: Judgment normal

## 2021-07-28 ENCOUNTER — HOSPITAL ENCOUNTER (OUTPATIENT)
Dept: RADIOLOGY | Facility: CLINIC | Age: 46
Discharge: HOME | End: 2021-07-28
Payer: COMMERCIAL

## 2021-07-28 VITALS — BODY MASS INDEX: 30.56 KG/M2 | WEIGHT: 179 LBS | HEIGHT: 64 IN

## 2021-07-28 DIAGNOSIS — Z12.31 ENCOUNTER FOR SCREENING MAMMOGRAM FOR BREAST CANCER: ICD-10-CM

## 2021-07-28 PROCEDURE — 77067 SCR MAMMO BI INCL CAD: CPT

## 2021-07-28 PROCEDURE — 77063 BREAST TOMOSYNTHESIS BI: CPT

## 2021-08-09 DIAGNOSIS — F17.210 CIGARETTE NICOTINE DEPENDENCE WITHOUT COMPLICATION: Primary | ICD-10-CM

## 2021-08-09 RX ORDER — VARENICLINE TARTRATE 1 MG/1
1 TABLET, FILM COATED ORAL 2 TIMES DAILY
Qty: 30 TABLET | Refills: 1 | Status: SHIPPED | OUTPATIENT
Start: 2021-08-09 | End: 2022-06-14

## 2021-08-21 DIAGNOSIS — I82.402 DEEP VEIN THROMBOSIS (DVT) OF LEFT LOWER EXTREMITY, UNSPECIFIED CHRONICITY, UNSPECIFIED VEIN (HCC): ICD-10-CM

## 2021-08-23 RX ORDER — RIVAROXABAN 15 MG/1
TABLET, FILM COATED ORAL
Qty: 30 TABLET | Refills: 2 | Status: SHIPPED | OUTPATIENT
Start: 2021-08-23 | End: 2021-11-19

## 2021-09-17 ENCOUNTER — APPOINTMENT (OUTPATIENT)
Dept: LAB | Facility: CLINIC | Age: 46
End: 2021-09-17
Payer: COMMERCIAL

## 2021-09-17 DIAGNOSIS — Z79.899 LONG TERM CURRENT USE OF DIURETIC: ICD-10-CM

## 2021-09-17 LAB
ALBUMIN SERPL BCP-MCNC: 3.4 G/DL (ref 3.5–5)
ALP SERPL-CCNC: 62 U/L (ref 46–116)
ALT SERPL W P-5'-P-CCNC: 22 U/L (ref 12–78)
ANION GAP SERPL CALCULATED.3IONS-SCNC: 3 MMOL/L (ref 4–13)
AST SERPL W P-5'-P-CCNC: 13 U/L (ref 5–45)
BILIRUB SERPL-MCNC: 0.38 MG/DL (ref 0.2–1)
BUN SERPL-MCNC: 17 MG/DL (ref 5–25)
CALCIUM ALBUM COR SERPL-MCNC: 9.8 MG/DL (ref 8.3–10.1)
CALCIUM SERPL-MCNC: 9.3 MG/DL (ref 8.3–10.1)
CHLORIDE SERPL-SCNC: 110 MMOL/L (ref 100–108)
CO2 SERPL-SCNC: 26 MMOL/L (ref 21–32)
CREAT SERPL-MCNC: 0.85 MG/DL (ref 0.6–1.3)
GFR SERPL CREATININE-BSD FRML MDRD: 82 ML/MIN/1.73SQ M
GLUCOSE P FAST SERPL-MCNC: 93 MG/DL (ref 65–99)
POTASSIUM SERPL-SCNC: 4.2 MMOL/L (ref 3.5–5.3)
PROT SERPL-MCNC: 7.2 G/DL (ref 6.4–8.2)
SODIUM SERPL-SCNC: 139 MMOL/L (ref 136–145)

## 2021-09-17 PROCEDURE — 80053 COMPREHEN METABOLIC PANEL: CPT

## 2021-09-17 PROCEDURE — 36415 COLL VENOUS BLD VENIPUNCTURE: CPT

## 2021-11-19 ENCOUNTER — APPOINTMENT (OUTPATIENT)
Dept: RADIOLOGY | Facility: CLINIC | Age: 46
End: 2021-11-19
Payer: COMMERCIAL

## 2021-11-19 ENCOUNTER — OFFICE VISIT (OUTPATIENT)
Dept: FAMILY MEDICINE CLINIC | Facility: CLINIC | Age: 46
End: 2021-11-19
Payer: COMMERCIAL

## 2021-11-19 VITALS
HEART RATE: 76 BPM | WEIGHT: 182 LBS | DIASTOLIC BLOOD PRESSURE: 68 MMHG | SYSTOLIC BLOOD PRESSURE: 120 MMHG | HEIGHT: 64 IN | TEMPERATURE: 98 F | BODY MASS INDEX: 31.07 KG/M2 | OXYGEN SATURATION: 99 %

## 2021-11-19 DIAGNOSIS — M25.561 PAIN AND SWELLING OF RIGHT KNEE: ICD-10-CM

## 2021-11-19 DIAGNOSIS — M25.561 ACUTE PAIN OF RIGHT KNEE: Primary | ICD-10-CM

## 2021-11-19 DIAGNOSIS — M25.461 PAIN AND SWELLING OF RIGHT KNEE: ICD-10-CM

## 2021-11-19 DIAGNOSIS — M25.561 ACUTE PAIN OF RIGHT KNEE: ICD-10-CM

## 2021-11-19 DIAGNOSIS — I82.402 DEEP VEIN THROMBOSIS (DVT) OF LEFT LOWER EXTREMITY, UNSPECIFIED CHRONICITY, UNSPECIFIED VEIN (HCC): ICD-10-CM

## 2021-11-19 PROCEDURE — 4004F PT TOBACCO SCREEN RCVD TLK: CPT | Performed by: NURSE PRACTITIONER

## 2021-11-19 PROCEDURE — 3008F BODY MASS INDEX DOCD: CPT | Performed by: NURSE PRACTITIONER

## 2021-11-19 PROCEDURE — 99213 OFFICE O/P EST LOW 20 MIN: CPT | Performed by: NURSE PRACTITIONER

## 2021-11-19 PROCEDURE — 73562 X-RAY EXAM OF KNEE 3: CPT

## 2021-11-19 RX ORDER — METHYLPREDNISOLONE 4 MG/1
TABLET ORAL
Qty: 21 EACH | Refills: 0 | Status: SHIPPED | OUTPATIENT
Start: 2021-11-19 | End: 2022-06-14

## 2021-11-19 RX ORDER — RIVAROXABAN 15 MG/1
TABLET, FILM COATED ORAL
Qty: 30 TABLET | Refills: 2 | Status: SHIPPED | OUTPATIENT
Start: 2021-11-19 | End: 2022-02-22

## 2021-12-14 ENCOUNTER — OFFICE VISIT (OUTPATIENT)
Dept: FAMILY MEDICINE CLINIC | Facility: CLINIC | Age: 46
End: 2021-12-14
Payer: COMMERCIAL

## 2021-12-14 VITALS
BODY MASS INDEX: 30.92 KG/M2 | DIASTOLIC BLOOD PRESSURE: 70 MMHG | SYSTOLIC BLOOD PRESSURE: 116 MMHG | OXYGEN SATURATION: 99 % | HEART RATE: 88 BPM | WEIGHT: 181.1 LBS | HEIGHT: 64 IN | TEMPERATURE: 98 F

## 2021-12-14 DIAGNOSIS — F17.210 CIGARETTE NICOTINE DEPENDENCE WITHOUT COMPLICATION: ICD-10-CM

## 2021-12-14 DIAGNOSIS — I82.402 DEEP VEIN THROMBOSIS (DVT) OF LEFT LOWER EXTREMITY, UNSPECIFIED CHRONICITY, UNSPECIFIED VEIN (HCC): ICD-10-CM

## 2021-12-14 DIAGNOSIS — Z00.00 ANNUAL PHYSICAL EXAM: Primary | ICD-10-CM

## 2021-12-14 PROCEDURE — 99406 BEHAV CHNG SMOKING 3-10 MIN: CPT | Performed by: NURSE PRACTITIONER

## 2021-12-14 PROCEDURE — 3725F SCREEN DEPRESSION PERFORMED: CPT | Performed by: NURSE PRACTITIONER

## 2021-12-14 PROCEDURE — 3008F BODY MASS INDEX DOCD: CPT | Performed by: NURSE PRACTITIONER

## 2021-12-14 PROCEDURE — 4004F PT TOBACCO SCREEN RCVD TLK: CPT | Performed by: NURSE PRACTITIONER

## 2021-12-14 PROCEDURE — 99396 PREV VISIT EST AGE 40-64: CPT | Performed by: NURSE PRACTITIONER

## 2022-01-25 ENCOUNTER — IMMUNIZATIONS (OUTPATIENT)
Dept: FAMILY MEDICINE CLINIC | Facility: HOSPITAL | Age: 47
End: 2022-01-25

## 2022-01-25 DIAGNOSIS — Z23 ENCOUNTER FOR IMMUNIZATION: Primary | ICD-10-CM

## 2022-01-25 PROCEDURE — 91300 COVID-19 PFIZER VACC 0.3 ML: CPT

## 2022-01-25 PROCEDURE — 0001A COVID-19 PFIZER VACC 0.3 ML: CPT

## 2022-02-03 ENCOUNTER — APPOINTMENT (OUTPATIENT)
Dept: LAB | Facility: CLINIC | Age: 47
End: 2022-02-03
Payer: COMMERCIAL

## 2022-02-03 DIAGNOSIS — Z00.00 ANNUAL PHYSICAL EXAM: ICD-10-CM

## 2022-02-03 LAB
CHOLEST SERPL-MCNC: 181 MG/DL
EST. AVERAGE GLUCOSE BLD GHB EST-MCNC: 111 MG/DL
HBA1C MFR BLD: 5.5 %
HDLC SERPL-MCNC: 44 MG/DL
LDLC SERPL CALC-MCNC: 115 MG/DL (ref 0–100)
NONHDLC SERPL-MCNC: 137 MG/DL
TRIGL SERPL-MCNC: 110 MG/DL

## 2022-02-03 PROCEDURE — 80061 LIPID PANEL: CPT

## 2022-02-03 PROCEDURE — 83036 HEMOGLOBIN GLYCOSYLATED A1C: CPT

## 2022-02-03 PROCEDURE — 36415 COLL VENOUS BLD VENIPUNCTURE: CPT

## 2022-02-22 DIAGNOSIS — I82.402 DEEP VEIN THROMBOSIS (DVT) OF LEFT LOWER EXTREMITY, UNSPECIFIED CHRONICITY, UNSPECIFIED VEIN (HCC): ICD-10-CM

## 2022-02-22 RX ORDER — RIVAROXABAN 15 MG/1
TABLET, FILM COATED ORAL
Qty: 30 TABLET | Refills: 2 | Status: SHIPPED | OUTPATIENT
Start: 2022-02-22 | End: 2022-05-18

## 2022-03-20 DIAGNOSIS — M79.89 SWELLING OF LOWER LEG: ICD-10-CM

## 2022-03-21 RX ORDER — FUROSEMIDE 20 MG/1
TABLET ORAL
Qty: 90 TABLET | Refills: 1 | Status: SHIPPED | OUTPATIENT
Start: 2022-03-21

## 2022-05-18 DIAGNOSIS — I82.402 DEEP VEIN THROMBOSIS (DVT) OF LEFT LOWER EXTREMITY, UNSPECIFIED CHRONICITY, UNSPECIFIED VEIN (HCC): ICD-10-CM

## 2022-05-18 RX ORDER — RIVAROXABAN 15 MG/1
TABLET, FILM COATED ORAL
Qty: 30 TABLET | Refills: 2 | Status: SHIPPED | OUTPATIENT
Start: 2022-05-18 | End: 2022-06-14 | Stop reason: SDUPTHER

## 2022-06-14 ENCOUNTER — OFFICE VISIT (OUTPATIENT)
Dept: FAMILY MEDICINE CLINIC | Facility: CLINIC | Age: 47
End: 2022-06-14
Payer: COMMERCIAL

## 2022-06-14 ENCOUNTER — APPOINTMENT (OUTPATIENT)
Dept: RADIOLOGY | Facility: CLINIC | Age: 47
End: 2022-06-14
Payer: COMMERCIAL

## 2022-06-14 VITALS
SYSTOLIC BLOOD PRESSURE: 122 MMHG | DIASTOLIC BLOOD PRESSURE: 72 MMHG | WEIGHT: 188 LBS | HEART RATE: 71 BPM | BODY MASS INDEX: 32.1 KG/M2 | OXYGEN SATURATION: 99 % | TEMPERATURE: 97.8 F | HEIGHT: 64 IN

## 2022-06-14 DIAGNOSIS — I82.402 DEEP VEIN THROMBOSIS (DVT) OF LEFT LOWER EXTREMITY, UNSPECIFIED CHRONICITY, UNSPECIFIED VEIN (HCC): ICD-10-CM

## 2022-06-14 DIAGNOSIS — G89.29 CHRONIC PAIN OF LEFT KNEE: ICD-10-CM

## 2022-06-14 DIAGNOSIS — G89.29 CHRONIC PAIN OF LEFT KNEE: Primary | ICD-10-CM

## 2022-06-14 DIAGNOSIS — M25.562 CHRONIC PAIN OF LEFT KNEE: ICD-10-CM

## 2022-06-14 DIAGNOSIS — M25.562 CHRONIC PAIN OF LEFT KNEE: Primary | ICD-10-CM

## 2022-06-14 DIAGNOSIS — Z12.31 ENCOUNTER FOR SCREENING MAMMOGRAM FOR BREAST CANCER: ICD-10-CM

## 2022-06-14 DIAGNOSIS — Z12.11 SCREENING FOR COLON CANCER: ICD-10-CM

## 2022-06-14 DIAGNOSIS — Z12.4 SCREENING FOR CERVICAL CANCER: ICD-10-CM

## 2022-06-14 PROCEDURE — 3008F BODY MASS INDEX DOCD: CPT | Performed by: NURSE PRACTITIONER

## 2022-06-14 PROCEDURE — 3725F SCREEN DEPRESSION PERFORMED: CPT | Performed by: NURSE PRACTITIONER

## 2022-06-14 PROCEDURE — 73562 X-RAY EXAM OF KNEE 3: CPT

## 2022-06-14 PROCEDURE — 99214 OFFICE O/P EST MOD 30 MIN: CPT | Performed by: NURSE PRACTITIONER

## 2022-06-14 PROCEDURE — 4004F PT TOBACCO SCREEN RCVD TLK: CPT | Performed by: NURSE PRACTITIONER

## 2022-06-14 NOTE — PROGRESS NOTES
Assessment/Plan:       Diagnoses and all orders for this visit:    Chronic pain of left knee  -     XR knee 3 vw left non injury; Future    Screening for cervical cancer  -     Ambulatory referral to Obstetrics / Gynecology; Future    Deep vein thrombosis (DVT) of left lower extremity, unspecified chronicity, unspecified vein (HCC)  -     rivaroxaban (Xarelto) 15 mg tablet; Take 1 tablet (15 mg total) by mouth daily with breakfast    Encounter for screening mammogram for breast cancer  -     Mammo screening bilateral w 3d & cad; Future    Screening for colon cancer  -     Cologuard      No refill needed at this time  Will have an xray completed of the left knee - no obvious effusion, swelling, tenderness on exam     Continues with lower extremity swelling but remains on a diuretic at this time  GYN information provided  Mammogram  Ordered  She is agreeable to the cologuard  No Hep C screening today  BMI Counseling: Body mass index is 32 27 kg/m²  The BMI is above normal  Nutrition recommendations include encouraging healthy choices of fruits and vegetables  Rationale for BMI follow-up plan is due to patient being overweight or obese  Depression Screening and Follow-up Plan: Patient was screened for depression during today's encounter  They screened negative with a PHQ-2 score of 0  Subjective:      Patient ID: Yair Brooke is a 49 y o  female  Here today for a follow-up  Hx of a multiple DVT's - on xarelto at this time  Reports ongoing left knee pain that has increased in pain  No clicking or locking, no obvious swelling  The following portions of the patient's history were reviewed and updated as appropriate: allergies, current medications, past family history, past medical history, past social history, past surgical history and problem list     Review of Systems   Constitutional: Negative  HENT: Negative  Respiratory: Negative      Cardiovascular: Positive for leg swelling (chronic left leg)  Gastrointestinal: Negative  Musculoskeletal:        See HPI   Neurological: Negative  All other systems reviewed and are negative          Objective:      /72 (BP Location: Left arm, Patient Position: Sitting)   Pulse 71   Temp 97 8 °F (36 6 °C)   Ht 5' 4" (1 626 m)   Wt 85 3 kg (188 lb)   SpO2 99%   BMI 32 27 kg/m²          Physical Exam

## 2022-06-20 DIAGNOSIS — M25.562 ACUTE PAIN OF LEFT KNEE: Primary | ICD-10-CM

## 2022-06-22 ENCOUNTER — TELEPHONE (OUTPATIENT)
Dept: FAMILY MEDICINE CLINIC | Facility: CLINIC | Age: 47
End: 2022-06-22

## 2022-06-22 NOTE — TELEPHONE ENCOUNTER
Called patient made aware of your recommendations she will monitor bruise for next few days and call us if worsens, no further questions

## 2022-06-22 NOTE — TELEPHONE ENCOUNTER
Patient states she was in the shower and noticed a bruise on her foot  She does not remember injuring herself at all  Patient wants to know if she should be concerned at all  Please advise

## 2022-06-22 NOTE — TELEPHONE ENCOUNTER
She is on a blood thinner which can make her susceptible to bruising even with mild trauma - If the bruise is small, then I would watch it for the next 1-2 days  If it is larger in size and painful then I would recommend an xray at least   Thanks

## 2022-06-28 ENCOUNTER — EVALUATION (OUTPATIENT)
Dept: PHYSICAL THERAPY | Facility: CLINIC | Age: 47
End: 2022-06-28
Payer: COMMERCIAL

## 2022-06-28 DIAGNOSIS — M25.562 ACUTE PAIN OF LEFT KNEE: ICD-10-CM

## 2022-06-28 PROCEDURE — 97161 PT EVAL LOW COMPLEX 20 MIN: CPT

## 2022-07-05 ENCOUNTER — OFFICE VISIT (OUTPATIENT)
Dept: PHYSICAL THERAPY | Facility: CLINIC | Age: 47
End: 2022-07-05
Payer: COMMERCIAL

## 2022-07-05 DIAGNOSIS — M25.562 ACUTE PAIN OF LEFT KNEE: Primary | ICD-10-CM

## 2022-07-05 PROCEDURE — 97110 THERAPEUTIC EXERCISES: CPT | Performed by: PHYSICAL THERAPIST

## 2022-07-05 NOTE — PROGRESS NOTES
Daily Note     Today's date: 2022  Patient name: Janine Robles  : 1975  MRN: [id number]  Referring provider: Nancy Pineda, *  Dx:   Encounter Diagnosis     ICD-10-CM    1  Acute pain of left knee  [id number] [id number]        Start Time: 1300  Stop Time: 1345  Total time in clinic (min): 45 minutes    Subjective: pt states she had increased knee pain following work  Symp still in knee and non-radiating  Objective: See treatment diary below      Assessment: Tolerated treatment well  Patient demonstrated fatigue post treatment, exhibited good technique with therapeutic exercises and would benefit from continued PT  Would benefit from continued physical therapy to promote improved activity tolerance and function  Plan: Continue per plan of care  Progress treatment as tolerated         Precautions: none     Daily Treatment Diary:      Initial Evaluation Date: 22  Compliance                    Visit Number 1 2                   Re-Eval  IE                 MC   Foto Captured Y                                              Manual                                                                                        Ther-Ex                      4-way SLR x10 ea  2x10 ea                   Prone TKE 10x5"  2x10 5"                   SLS A/P tap x20  2x20                   Side step down 6in x10  6in 2x10                   SLS balance   10x10"                   Straight leg bridge  2x10 5"                   Hamstring stretch  4x30"                   SLS Ant UE reach  2x10                                                               Neuro Re-Ed                                                                                                Ther-Act                                                               Modalities

## 2022-07-06 ENCOUNTER — OFFICE VISIT (OUTPATIENT)
Dept: PHYSICAL THERAPY | Facility: CLINIC | Age: 47
End: 2022-07-06
Payer: COMMERCIAL

## 2022-07-06 DIAGNOSIS — M25.562 ACUTE PAIN OF LEFT KNEE: Primary | ICD-10-CM

## 2022-07-06 PROCEDURE — 97112 NEUROMUSCULAR REEDUCATION: CPT | Performed by: PHYSICAL THERAPIST

## 2022-07-06 PROCEDURE — 97110 THERAPEUTIC EXERCISES: CPT | Performed by: PHYSICAL THERAPIST

## 2022-07-06 NOTE — PROGRESS NOTES
Daily Note     Today's date: 2022  Patient name: Efrain Zuniga  : 1975  MRN: [id number]  Referring provider: Valerie Montez, *  Dx:   Encounter Diagnosis     ICD-10-CM    1  Acute pain of left knee  M25 562        Start Time: 1300  Stop Time: 1345  Total time in clinic (min): 45 minutes    Subjective: Pt reports no change from yesterday last session  No edema noted  Pain reported as 2/10 at start of session  Objective: See treatment diary below  Increased balance/proprioception exercise  Assessment: Pt was progressed with PT interventions, focusing on appropriate technique and intensity  Dynamic knee stability limited  Pt would benefit from continued skilled PT to resolve remaining functional impairments and facilitate return to PLOF  Plan: Continue per plan of care  Progress treatment as tolerated           Precautions: none     Daily Treatment Diary:      Initial Evaluation Date: 22  Compliance                  Visit Number 1 2  3                 Re-Eval  IE                 [facility]   Foto Captured Y                                            Manual                                                                                        Ther-Ex                      4-way SLR x10 ea  2x10 ea  2x10 ea                 Prone TKE 10x5"  2x10 5"  2x10 5"                 SLS A/P tap x20  2x20 NR                 Side step down 6in x10  6in 2x10  6in 2x10                 SLS balance   10x10"  NR                 Straight leg bridge  2x10 5"  2x10 5"                 Hamstring stretch  4x30"  4x30"                 SLS Ant UE reach  2x10  NR                 Recumbent bike    5min                                       Neuro Re-Ed                      Supine Bridge    2x10 5"                 SLS Ant UE reach   2x10          SLS A/P tap   2x20          SLS Ant LE reach   2x10          SLS balance   10x10"                       Ther-Act Modalities

## 2022-07-11 ENCOUNTER — OFFICE VISIT (OUTPATIENT)
Dept: PHYSICAL THERAPY | Facility: CLINIC | Age: 47
End: 2022-07-11
Payer: COMMERCIAL

## 2022-07-11 DIAGNOSIS — M25.562 ACUTE PAIN OF LEFT KNEE: Primary | ICD-10-CM

## 2022-07-11 PROCEDURE — 97110 THERAPEUTIC EXERCISES: CPT | Performed by: PHYSICAL THERAPIST

## 2022-07-11 PROCEDURE — 97112 NEUROMUSCULAR REEDUCATION: CPT | Performed by: PHYSICAL THERAPIST

## 2022-07-11 NOTE — PROGRESS NOTES
Daily Note     Today's date: 2022  Patient name: Sammie Fleming  : 1975  MRN: [id number]  Referring provider: Jared Howard, *  Dx:   Encounter Diagnosis     ICD-10-CM    1  Acute pain of left knee  M25 562        Start Time: 07  Stop Time: 0800  Total time in clinic (min): 55 minutes    Subjective: Pt reports continued L knee pain since last session  Has increased pain and swelling following work  Pain reported as 2/10 at start of session  Objective: See treatment diary below  Assessment: Pt was continued with PT interventions, focusing on appropriate technique and intensity  No change noted c medial gapping during ROM  Dynamic knee stability limited  Pt would benefit from continued skilled PT to resolve remaining functional impairments and facilitate return to PLOF  Plan: Continue per plan of care  Progress treatment as tolerated           Precautions: none     Daily Treatment Diary:      Initial Evaluation Date: 22  Compliance                Visit Number 1 2  3  4               Re-Eval  IE                 [facility]   Foto Captured Y                                          Manual                                                                                        Ther-Ex                      4-way SLR x10 ea  2x10 ea  2x10 ea  2x10 ea               Prone TKE 10x5"  2x10 5"  2x10 5"  2x10 5"               SLS A/P tap x20  2x20 NR                 Side step down 6in x10  6in 2x10  6in 2x10  6in 2x10               SLS balance   10x10"  NR                 Straight leg bridge  2x10 5"  2x10 5"   2x10 5"               Hamstring stretch  4x30"  4x30"  4x30"               SLS Ant UE reach  2x10  NR                 Recumbent bike    5min  5min                                     Neuro Re-Ed                      Supine Bridge    2x10 5"  2x10 5"               SLS Ant UE reach   2x10 2x10         SLS A/P tap   2x20 2x20         SLS Ant LE reach   2x10 2x10         SLS balance   10x10" 10x10"                      Ther-Act                                                               Modalities

## 2022-07-13 ENCOUNTER — OFFICE VISIT (OUTPATIENT)
Dept: PHYSICAL THERAPY | Facility: CLINIC | Age: 47
End: 2022-07-13
Payer: COMMERCIAL

## 2022-07-13 DIAGNOSIS — M25.562 ACUTE PAIN OF LEFT KNEE: Primary | ICD-10-CM

## 2022-07-13 PROCEDURE — 97110 THERAPEUTIC EXERCISES: CPT | Performed by: PHYSICAL THERAPIST

## 2022-07-13 PROCEDURE — 97112 NEUROMUSCULAR REEDUCATION: CPT | Performed by: PHYSICAL THERAPIST

## 2022-07-13 NOTE — PROGRESS NOTES
Daily Note     Today's date: 2022  Patient name: Griselda Cortes  : 1975  MRN: [id number]  Referring provider: Afshan Jesus, *  Dx:   Encounter Diagnosis     ICD-10-CM    1  Acute pain of left knee  M25 562        Start Time: 915  Stop Time: 100  Total time in clinic (min): 50 minutes    Subjective: Pt states knee feeling pretty good today because she didn't work last night  Pain reported as 1/10 at start of session  Objective: See treatment diary below  Assessment: Pt was continued with PT interventions, focusing on appropriate technique and intensity  Req decreased rest breaks during session and tolerated increased loads  Pt would benefit from continued skilled PT to resolve remaining functional impairments and facilitate return to PLOF  Plan: Continue per plan of care  Progress treatment as tolerated           Precautions: none     Daily Treatment Diary:      Initial Evaluation Date: 22  Compliance              Visit Number 1 2  3  4  5             Re-Eval  IE                 [facility]   Foto Captured Y                                        Manual                                                                                        Ther-Ex                      4-way SLR x10 ea  2x10 ea  2x10 ea  2x10 ea  2# 2x10 ea             Prone TKE 10x5"  2x10 5"  2x10 5"  2x10 5" 2x10 5"             SLS A/P tap x20  2x20 NR                 Side step down 6in x10  6in 2x10  6in 2x10  6in 2x10  6in 2x10             SLS balance   10x10"  NR                 Straight leg bridge  2x10 5"  2x10 5"   2x10 5"  2x10 5"             Hamstring stretch  4x30"  4x30"  4x30"  4x30"             SLS Ant UE reach  2x10  NR                 Recumbent bike    5min  5min  6'                                   Neuro Re-Ed                      Supine Bridge    2x10 5"  2x10 5"  Franco   2x10 5"             SLS Ant UE reach   2x10 2x10 2x10        SLS A/P tap   2x20 2x20 2x20        SLS Ant LE reach   2x10 2x10 2x10        SLS balance   10x10" 10x10" 10x10"                     Ther-Act                                                               Modalities

## 2022-07-18 ENCOUNTER — OFFICE VISIT (OUTPATIENT)
Dept: PHYSICAL THERAPY | Facility: CLINIC | Age: 47
End: 2022-07-18
Payer: COMMERCIAL

## 2022-07-18 DIAGNOSIS — M25.562 ACUTE PAIN OF LEFT KNEE: Primary | ICD-10-CM

## 2022-07-18 PROCEDURE — 97110 THERAPEUTIC EXERCISES: CPT | Performed by: PHYSICAL THERAPIST

## 2022-07-18 PROCEDURE — 97112 NEUROMUSCULAR REEDUCATION: CPT | Performed by: PHYSICAL THERAPIST

## 2022-07-18 NOTE — PROGRESS NOTES
Daily Note     Today's date: 2022  Patient name: Joanie Silva  : 1975  MRN: [id number]  Referring provider: Malcom Boeck, *  Dx:   Encounter Diagnosis     ICD-10-CM    1  Acute pain of left knee  M25 562        Start Time: 815  Stop Time: 902  Total time in clinic (min): 47 minutes    Subjective: No sig change from last session  Pt states knee feeling pretty good today because she didn't work last night  Pain reported as 1/10 at start of session  Objective: See treatment diary below  Assessment: Pt continues to be limited by medial knee joint line pain  Performs better when didn't work that day  Req decreased rest breaks during session and tolerated increased loads  Pt would benefit from continued skilled PT to resolve remaining functional impairments and facilitate return to PLOF  Plan: Continue per plan of care  Progress treatment as tolerated           Precautions: none     Daily Treatment Diary:      Initial Evaluation Date: 22  Compliance            Visit Number 1 2  3  4  5  6           Re-Eval  IE                 [facility]   Foto Captured Y                                      Manual                                                                                        Ther-Ex                      4-way SLR x10 ea  2x10 ea  2x10 ea  2x10 ea  2# 2x10 ea  2# 2x10 ea           Prone TKE 10x5"  2x10 5"  2x10 5"  2x10 5" 2x10 5"  2x10 5"           SLS A/P tap x20  2x20 NR                 Side step down 6in x10  6in 2x10  6in 2x10  6in 2x10  6in 2x10 6in 3x10           SLS balance   10x10"  NR                 Straight leg bridge  2x10 5"  2x10 5"   2x10 5"  2x10 5"  Red 2x10 5"           Hamstring stretch  4x30"  4x30"  4x30"  4x30"  4x30"           SLS Ant UE reach  2x10  NR                 Recumbent bike    5min  5min  6'  6'                                 Neuro Re-Ed                      Supine Bridge 2x10 5"  2x10 5"  Franco   2x10 5"  Franco   2x10 5"           SLS Ant UE reach   2x10 2x10 3x10 3x10       SLS A/P tap   2x20 2x20 2x20 2x20       SLS Ant LE reach   2x10 2x10 2x10 3x10       SLS balance   10x10" 10x10" 10x10" 10x10"                    Ther-Act                                                               Modalities

## 2022-07-20 ENCOUNTER — OFFICE VISIT (OUTPATIENT)
Dept: PHYSICAL THERAPY | Facility: CLINIC | Age: 47
End: 2022-07-20
Payer: COMMERCIAL

## 2022-07-20 DIAGNOSIS — M25.562 ACUTE PAIN OF LEFT KNEE: Primary | ICD-10-CM

## 2022-07-20 PROCEDURE — 97112 NEUROMUSCULAR REEDUCATION: CPT | Performed by: PHYSICAL THERAPIST

## 2022-07-20 PROCEDURE — 97110 THERAPEUTIC EXERCISES: CPT | Performed by: PHYSICAL THERAPIST

## 2022-07-20 NOTE — PROGRESS NOTES
Daily Note     Today's date: 2022  Patient name: Janine Robles  : 1975  MRN: [id number]  Referring provider: Nancy Pineda, *  Dx:   Encounter Diagnosis     ICD-10-CM    1  Acute pain of left knee  M25 562        Start Time: 915  Stop Time: 100  Total time in clinic (min): 50 minutes    Subjective: No sig change from last session  Pt states knee feeling pretty good today because she didn't work last night  Pain reported as 1/10 at start of session  Objective: See treatment diary below  Assessment: Pt continues to be limited by medial knee joint line pain  Shows continued slow improvement toward functional goals  Performs better when didn't work that day  Pt would benefit from continued skilled PT to resolve remaining functional impairments and facilitate return to PLOF  Plan: Continue per plan of care  Progress treatment as tolerated           Precautions: none     Daily Treatment Diary:      Initial Evaluation Date: 22  Compliance          Visit Number 1 2  3  4  5  6  7         Re-Eval  IE                 [facility]   Foto Captured Y                                    Manual                                                                                        Ther-Ex                      4-way SLR x10 ea  2x10 ea  2x10 ea  2x10 ea  2# 2x10 ea  2# 2x10 ea  2 5# 2x10 ea         Prone TKE 10x5"  2x10 5"  2x10 5"  2x10 5" 2x10 5"  2x10 5"  2x10 5"         SLS A/P tap x20  2x20 NR                 Side step down 6in x10  6in 2x10  6in 2x10  6in 2x10  6in 2x10 6in 3x10 6in 3x10         SLS balance   10x10"  NR                 Sq       3x10      Straight leg bridge  2x10 5"  2x10 5"   2x10 5"  2x10 5"  Red 2x10 5"  Red 2x10 5"         Hamstring stretch  4x30"  4x30"  4x30"  4x30"  4x30"  D/C          SLS Ant UE reach  2x10  NR                 Recumbent bike    5min  5min  6'  6'  6' Neuro Re-Ed                      Supine Bridge    2x10 5"  2x10 5"  Franco   2x10 5"  Franco   2x10 5"  Franco   2x10 5"         SLS Ant UE reach   2x10 2x10 3x10 3x10 3x10      SLS A/P tap   2x20 2x20 2x20 2x20 2x20      SLS Ant LE reach   2x10 2x10 2x10 3x10 3x10      SLS balance   10x10" 10x10" 10x10" 10x10" 10x10"                   Ther-Act                                                               Modalities

## 2022-07-25 ENCOUNTER — OFFICE VISIT (OUTPATIENT)
Dept: PHYSICAL THERAPY | Facility: CLINIC | Age: 47
End: 2022-07-25
Payer: COMMERCIAL

## 2022-07-25 DIAGNOSIS — M25.562 ACUTE PAIN OF LEFT KNEE: Primary | ICD-10-CM

## 2022-07-25 PROCEDURE — 97110 THERAPEUTIC EXERCISES: CPT | Performed by: PHYSICAL THERAPIST

## 2022-07-25 PROCEDURE — 97112 NEUROMUSCULAR REEDUCATION: CPT | Performed by: PHYSICAL THERAPIST

## 2022-07-25 NOTE — PROGRESS NOTES
Daily Note     Today's date: 2022  Patient name: Jose Maria Lewis  : 1975  MRN: [id number]  Referring provider: Angy Gallegos, *  Dx:   Encounter Diagnosis     ICD-10-CM    1  Acute pain of left knee  M25 562        Start Time: 0700  Stop Time: 0745  Total time in clinic (min): 45 minutes    Subjective: pt reports increased knee pain this morning following work last night  Pain reported as 4/10 at start of session  Objective: See treatment diary below  Assessment: Pt continues to be limited by medial knee joint line pain  Impaired balance/proprioception contribute to difficulty walking on uneven surfaces  Shows continued slow improvement toward functional goals  Pt would benefit from continued skilled PT to resolve remaining functional impairments and facilitate return to PLOF  Plan: Continue per plan of care  Progress treatment as tolerated           Precautions: none     Daily Treatment Diary:      Initial Evaluation Date: 22  Compliance        Visit Number 1 2  3  4  5  6  7  8       Re-Eval  IE                 [facility]   Foto Captured Y                                  Manual                                                                                        Ther-Ex                      4-way SLR x10 ea  2x10 ea  2x10 ea  2x10 ea  2# 2x10 ea  2# 2x10 ea  2 5# 2x10 ea 2 5# 2x10 ea       Prone TKE 10x5"  2x10 5"  2x10 5"  2x10 5" 2x10 5"  2x10 5"  2x10 5"  2x10 5"       SLS A/P tap x20  2x20 NR                 Side step down 6in x10  6in 2x10  6in 2x10  6in 2x10  6in 2x10 6in 3x10 6in 3x10  6in 3x10       SLS balance   10x10"  NR                 Sq       3x10 3x10     Straight leg bridge  2x10 5"  2x10 5"   2x10 5"  2x10 5"  Red 2x10 5"  Red 2x10 5"  D/C       Hamstring stretch  4x30"  4x30"  4x30"  4x30"  4x30"  D/C   D/C       SLS Ant UE reach  2x10  NR                 Recumbent bike 5min  5min  6'  6'  6'  6'                             Neuro Re-Ed                      Supine Bridge    2x10 5"  2x10 5"  Franco   2x10 5"  Franco   2x10 5"  Franco   2x10 5"  Franco   2x10 5"       SLS Ant UE reach   2x10 2x10 3x10 3x10 3x10 3x10     SLS A/P tap   2x20 2x20 2x20 2x20 2x20 2x20     SLS Ant LE reach   2x10 2x10 2x10 3x10 3x10 3x10     SLS balance   10x10" 10x10" 10x10" 10x10" 10x10" 10x10"                  Ther-Act                                                               Modalities

## 2022-07-27 ENCOUNTER — OFFICE VISIT (OUTPATIENT)
Dept: PHYSICAL THERAPY | Facility: CLINIC | Age: 47
End: 2022-07-27
Payer: COMMERCIAL

## 2022-07-27 DIAGNOSIS — M25.562 ACUTE PAIN OF LEFT KNEE: Primary | ICD-10-CM

## 2022-07-27 PROCEDURE — 97112 NEUROMUSCULAR REEDUCATION: CPT | Performed by: PHYSICAL THERAPIST

## 2022-07-27 PROCEDURE — 97110 THERAPEUTIC EXERCISES: CPT | Performed by: PHYSICAL THERAPIST

## 2022-07-27 NOTE — PROGRESS NOTES
Daily Note     Today's date: 2022  Patient name: Josesito Block  : 1975  MRN: [id number]  Referring provider: Mikel Duffy, *  Dx:   Encounter Diagnosis     ICD-10-CM    1  Acute pain of left knee  M25 562        Start Time: 1000  Stop Time: 1045  Total time in clinic (min): 45 minutes    Subjective: pt reports continued knee pain this morning  Pain tends to increase with activity and swell at end of workday  Knows she can do more, but it still hurts  Pain reported as 4/10 at start of session  Objective: See treatment diary below  Assessment: Pt continues to be limited by medial knee joint line pain  Impaired balance/proprioception contribute to difficulty walking on uneven surfaces  FOTO improved by 5pts, but remains limited in subjective performance  PT discussed c pt returning to PCP if there was any medication options to help c her pain  Pt would benefit from continued skilled PT to resolve remaining functional impairments and facilitate return to PLOF  Plan: Continue per plan of care  Progress treatment as tolerated           Precautions: none     Daily Treatment Diary:      Initial Evaluation Date: 22  Compliance      Visit Number 1 2  3  4  5  6  7  8  9     Re-Eval  IE                 [facility]   Foto Captured Y                                Manual                                                                                        Ther-Ex                      4-way SLR x10 ea  2x10 ea  2x10 ea  2x10 ea  2# 2x10 ea  2# 2x10 ea  2 5# 2x10 ea 2 5# 2x10 ea  3# 2x10 ea     Prone TKE 10x5"  2x10 5"  2x10 5"  2x10 5" 2x10 5"  2x10 5"  2x10 5"  2x10 5"  2x10 5"     SLS A/P tap x20  2x20 NR                 Side step down 6in x10  6in 2x10  6in 2x10  6in 2x10  6in 2x10 6in 3x10 6in 3x10  6in 3x10  6in 3x10     SLS balance   10x10"  NR                 Sq       3x10 3x10 3x10 Straight leg bridge  2x10 5"  2x10 5"   2x10 5"  2x10 5"  Red 2x10 5"  Red 2x10 5"  D/C       Hamstring stretch  4x30"  4x30"  4x30"  4x30"  4x30"  D/C   D/C       SLS Ant UE reach  2x10  NR                 Recumbent bike    5min  5min  6'  6'  6'  6'  6'                           Neuro Re-Ed                      Supine Bridge    2x10 5"  2x10 5"  Franco   2x10 5"  Franco   2x10 5"  Franco   2x10 5"  Franco   2x10 5"  Franco   2x10 5"     SLS Ant UE reach   2x10 2x10 3x10 3x10 3x10 3x10 3x10    SLS A/P tap   2x20 2x20 2x20 2x20 2x20 2x20 2x20    SLS Ant LE reach   2x10 2x10 2x10 3x10 3x10 3x10 3x10    SLS balance   10x10" 10x10" 10x10" 10x10" 10x10" 10x10" 10x10"                 Ther-Act                                                               Modalities

## 2022-07-29 ENCOUNTER — HOSPITAL ENCOUNTER (OUTPATIENT)
Dept: RADIOLOGY | Facility: CLINIC | Age: 47
Discharge: HOME | End: 2022-07-29
Payer: COMMERCIAL

## 2022-07-29 VITALS — BODY MASS INDEX: 32.1 KG/M2 | HEIGHT: 64 IN | WEIGHT: 188 LBS

## 2022-07-29 DIAGNOSIS — Z12.31 ENCOUNTER FOR SCREENING MAMMOGRAM FOR BREAST CANCER: ICD-10-CM

## 2022-07-29 PROCEDURE — 77063 BREAST TOMOSYNTHESIS BI: CPT

## 2022-07-29 PROCEDURE — 77067 SCR MAMMO BI INCL CAD: CPT

## 2022-08-02 NOTE — PATIENT INSTRUCTIONS
Wellness Visit for Adults   AMBULATORY CARE:   A wellness visit  is when you see your healthcare provider to get screened for health problems  Your healthcare provider will also give you advice on how to stay healthy  Write down your questions so you remember to ask them  Ask your healthcare provider how often you should have a wellness visit  What happens at a wellness visit:  Your healthcare provider will ask about your health, and your family history of health problems  This includes high blood pressure, heart disease, and cancer  He or she will ask if you have symptoms that concern you, if you smoke, and about your mood  You may also be asked about your intake of medicines, supplements, food, and alcohol  Any of the following may be done: Your weight  will be checked  Your height may also be checked so your body mass index (BMI) can be calculated  Your BMI shows if you are at a healthy weight  Your blood pressure  and heart rate will be checked  Your temperature may also be checked  Blood and urine tests  may be done  Blood tests may be done to check your cholesterol levels  Abnormal cholesterol levels increase your risk for heart disease and stroke  You may also need a blood or urine test to check for diabetes if you are at increased risk  Urine tests may be done to look for signs of an infection or kidney disease  A physical exam  includes checking your heartbeat and lungs with a stethoscope  Your healthcare provider may also check your skin to look for sun damage  Screening tests  may be recommended  A screening test is done to check for diseases that may not cause symptoms  The screening tests you may need depend on your age, gender, family history, and lifestyle habits  For example, colorectal screening may be recommended if you are 48years old or older  Screening tests you need if you are a woman:   A Pap smear  is used to screen for cervical cancer   Pap smears are usually done every 3 to 5 years depending on your age  You may need them more often if you have had abnormal Pap smear test results in the past  Ask your healthcare provider how often you should have a Pap smear  A mammogram  is an x-ray of your breasts to screen for breast cancer  Experts recommend mammograms every 2 years starting at age 48 years  You may need a mammogram at age 52 years or younger if you have an increased risk for breast cancer  Talk to your healthcare provider about when you should start having mammograms and how often you need them  Vaccines you may need:   Get an influenza vaccine  every year  The influenza vaccine protects you from the flu  Several types of viruses cause the flu  The viruses change over time, so new vaccines are made each year  Get a tetanus-diphtheria (Td) booster vaccine  every 10 years  This vaccine protects you against tetanus and diphtheria  Tetanus is a severe infection that may cause painful muscle spasms and lockjaw  Diphtheria is a severe bacterial infection that causes a thick covering in the back of your mouth and throat  Get a human papillomavirus (HPV) vaccine  if you are female and aged 23 to 32 or male 23 to 24 and never received it  This vaccine protects you from HPV infection  HPV is the most common infection spread by sexual contact  HPV may also cause vaginal, penile, and anal cancers  Get a pneumococcal vaccine  if you are aged 72 years or older  The pneumococcal vaccine is an injection given to protect you from pneumococcal disease  Pneumococcal disease is an infection caused by pneumococcal bacteria  The infection may cause pneumonia, meningitis, or an ear infection  Get a shingles vaccine  if you are 60 or older, even if you have had shingles before  The shingles vaccine is an injection to protect you from the varicella-zoster virus  This is the same virus that causes chickenpox   Shingles is a painful rash that develops in people who had chickenpox or have been exposed to the virus  How to eat healthy:  My Plate is a model for planning healthy meals  It shows the types and amounts of foods that should go on your plate  Fruits and vegetables make up about half of your plate, and grains and protein make up the other half  A serving of dairy is included on the side of your plate  The amount of calories and serving sizes you need depends on your age, gender, weight, and height  Examples of healthy foods are listed below:  Eat a variety of vegetables  such as dark green, red, and orange vegetables  You can also include canned vegetables low in sodium (salt) and frozen vegetables without added butter or sauces  Eat a variety of fresh fruits , canned fruit in 100% juice, frozen fruit, and dried fruit  Include whole grains  At least half of the grains you eat should be whole grains  Examples include whole-wheat bread, wheat pasta, brown rice, and whole-grain cereals such as oatmeal     Eat a variety of protein foods such as seafood (fish and shellfish), lean meat, and poultry without skin (turkey and chicken)  Examples of lean meats include pork leg, shoulder, or tenderloin, and beef round, sirloin, tenderloin, and extra lean ground beef  Other protein foods include eggs and egg substitutes, beans, peas, soy products, nuts, and seeds  Choose low-fat dairy products such as skim or 1% milk or low-fat yogurt, cheese, and cottage cheese  Limit unhealthy fats  such as butter, hard margarine, and shortening  Exercise:  Exercise at least 30 minutes per day on most days of the week  Some examples of exercise include walking, biking, dancing, and swimming  You can also fit in more physical activity by taking the stairs instead of the elevator or parking farther away from stores  Include muscle strengthening activities 2 days each week  Regular exercise provides many health benefits   It helps you manage your weight, and decreases your risk for type 2 diabetes, heart disease, stroke, and high blood pressure  Exercise can also help improve your mood  Ask your healthcare provider about the best exercise plan for you  General health and safety guidelines:   Do not smoke  Nicotine and other chemicals in cigarettes and cigars can cause lung damage  Ask your healthcare provider for information if you currently smoke and need help to quit  E-cigarettes or smokeless tobacco still contain nicotine  Talk to your healthcare provider before you use these products  Limit alcohol  A drink of alcohol is 12 ounces of beer, 5 ounces of wine, or 1½ ounces of liquor  Lose weight, if needed  Being overweight increases your risk of certain health conditions  These include heart disease, high blood pressure, type 2 diabetes, and certain types of cancer  Protect your skin  Do not sunbathe or use tanning beds  Use sunscreen with a SPF 15 or higher  Apply sunscreen at least 15 minutes before you go outside  Reapply sunscreen every 2 hours  Wear protective clothing, hats, and sunglasses when you are outside  Drive safely  Always wear your seatbelt  Make sure everyone in your car wears a seatbelt  A seatbelt can save your life if you are in an accident  Do not use your cell phone when you are driving  This could distract you and cause an accident  Pull over if you need to make a call or send a text message  Practice safe sex  Use latex condoms if are sexually active and have more than one partner  Your healthcare provider may recommend screening tests for sexually transmitted infections (STIs)  Wear helmets, lifejackets, and protective gear  Always wear a helmet when you ride a bike or motorcycle, go skiing, or play sports that could cause a head injury  Wear protective equipment when you play sports  Wear a lifejacket when you are on a boat or doing water sports      © Copyright [employer] 2022 Information is for End User's use only and may not be sold, redistributed or otherwise used for commercial purposes  All illustrations and images included in CareNotes® are the copyrighted property of [employer]  or Terrance Torrez  The above information is an  only  It is not intended as medical advice for individual conditions or treatments  Talk to your doctor, nurse or pharmacist before following any medical regimen to see if it is safe and effective for you

## 2022-08-02 NOTE — PROGRESS NOTES
Assessment        Diagnoses and all orders for this visit:    Encntr for gyn exam (general) (routine) w/o abn findings    Cervical cancer screening  -     Liquid-based pap, screening    Breast asymmetry    Folliculitis  -     mupirocin (BACTROBAN) 2 % ointment; Apply topically 3 (three) times a day as needed (folicullitis)  -     chlorhexidine (HIBICLENS) 4 % external liquid; Apply 1 application topically daily as needed for wound care (5 nights)             Plan      All questions answered  Await pap smear results  Contraception: tubal ligation  Educational material distributed  Follow up in 1 year  Follow up as needed  Mammogram      Additional breast imaging ordered by PCP - advised pt to schedule, order requisitions printed    Patient with folliculitis, recurrent, advised use of mupirocin ointment topically as needed  She was advised chlorhexidine washes  She was advised avoidance of shaving  Subjective      Jeremiah La is a 49 y o  female who presents for annual exam       Chief Complaint   Patient presents with    New Patient Visit    Gynecologic Exam     Patient states she has a hx of cysts     She denies pelvic pain, abnormal vaginal discharge or odor, abnormal vaginal bleeding, menstrual problems or UTI symptoms  She has some lumps in her thigh/groin area that get inflamed (cysts on her skins)  She has had a h/o skin abscess, spontaneously drained      Last Pap: 1198  Last mammogram: 7/29/22    B) ASYMMETRY: There is a 6 mm x 6 mm x 5 mm asymmetry seen in the retroareolar region of the left breast in the anterior depth  This is a new finding       Colorectal cancer screening: cologuard ordered by PCP        Current contraception: tubal ligation  History of abnormal Pap smear: no  History of abnormal mammogram: 3 years ago, cyst    Family history of uterine or ovarian cancer: no  Family history of breast cancer: no  Family history of colon cancer: no        Menstrual History:  OB History    3    Para   3    Term   3       0    AB   0    Living   3       SAB   0    IAB   0    Ectopic   0    Multiple   0    Live Births   3           Obstetric Comments   Laparoscopic tubal ligation   x 3  Menarche: 14            Menarche age: 14  Patient's last menstrual period was 2022 (approximate)               Past Medical History:   Diagnosis Date    Deep vein blood clot of left lower extremity ([address] )      Past Surgical History:   Procedure Laterality Date    TUBAL LIGATION      US BREAST CYST ASPIRATION RIGHT INITIAL Right 2019     Family History   Problem Relation Age of Onset    Skin cancer Mother     Edema Father     Throat cancer Father     No Known Problems Sister     No Known Problems Daughter     No Known Problems Maternal Grandmother     Stroke Maternal Grandfather     No Known Problems Paternal Grandmother     No Known Problems Paternal Grandfather     Colon cancer Maternal Uncle     No Known Problems Paternal Aunt     No Known Problems Paternal Aunt     No Known Problems Paternal Aunt     Breast cancer Neg Hx     Breast cancer additional onset Neg Hx     Ovarian cancer Neg Hx     Endometrial cancer Neg Hx     BRCA2 Positive Neg Hx     BRCA2 Negative Neg Hx     BRCA1 Positive Neg Hx     BRCA1 Negative Neg Hx     BRCA 1/2 Neg Hx        Social History     Tobacco Use    Smoking status: Current Every Day Smoker     Packs/day: 1 00     Years: 20 00     Pack years: 20 00     Start date: 1999    Smokeless tobacco: Never Used   Vaping Use    Vaping Use: Never used   Substance Use Topics    Alcohol use: Yes     Comment: occasional    Drug use: Not Currently          Current Outpatient Medications:     chlorhexidine (HIBICLENS) 4 % external liquid, Apply 1 application topically daily as needed for wound care (5 nights), Disp: 120 mL, Rfl: 3    furosemide (LASIX) 20 mg tablet, TAKE 1 TABLET BY MOUTH EVERY DAY, Disp: 90 tablet, Rfl: 1   mupirocin (BACTROBAN) 2 % ointment, Apply topically 3 (three) times a day as needed (folicullitis), Disp: 22 g, Rfl: 0    rivaroxaban (Xarelto) 15 mg tablet, Take 1 tablet (15 mg total) by mouth daily with breakfast, Disp: 90 tablet, Rfl: 1    No Known Allergies        Review of Systems   Constitutional: Negative  HENT: Negative  Eyes: Negative  Respiratory: Negative  Cardiovascular: Negative  Gastrointestinal: Negative  Endocrine: Negative  Genitourinary:        As noted in HPI   Musculoskeletal: Negative  Skin: Negative  Allergic/Immunologic: Negative  Neurological: Negative  Hematological: Negative  Psychiatric/Behavioral: Negative  /70 (BP Location: Right arm, Patient Position: Sitting, Cuff Size: Adult)   Pulse 81   Temp (!) 97 4 °F (36 3 °C) (Tympanic)   Ht 5' 4" (1 626 m)   Wt 82 4 kg (181 lb 9 6 oz)   LMP 05/22/2022 (Approximate)   BMI 31 17 kg/m²         Physical Exam  Constitutional:       Appearance: She is well-developed  Genitourinary:      Vulva, bladder and rectum normal       No lesions in the vagina  Genitourinary Comments:         Right Labia: No rash, tenderness, lesions, skin changes or Bartholin's cyst      Left Labia: No tenderness, lesions, skin changes, Bartholin's cyst or rash  No inguinal adenopathy present in the right or left side  No vaginal discharge, tenderness or bleeding  No vaginal prolapse present  No vaginal atrophy present  Right Adnexa: not tender, not full and no mass present  Left Adnexa: not tender, not full and no mass present  No cervical motion tenderness, friability, lesion or polyp  Uterus is not enlarged or tender  Pelvic exam was performed with patient in the lithotomy position  Rectum:      No external hemorrhoid  Breasts:      Right: No mass, nipple discharge, skin change or tenderness  Left: No mass, nipple discharge, skin change or tenderness  HENT:      Head: Normocephalic  Nose: Nose normal    Eyes:      Conjunctiva/sclera: Conjunctivae normal    Neck:      Thyroid: No thyromegaly  Cardiovascular:      Rate and Rhythm: Normal rate and regular rhythm  Heart sounds: Normal heart sounds  No murmur heard  Pulmonary:      Effort: Pulmonary effort is normal  No respiratory distress  Breath sounds: Normal breath sounds  No wheezing or rales  Abdominal:      General: There is no distension  Palpations: Abdomen is soft  There is no mass  Tenderness: There is no abdominal tenderness  There is no guarding or rebound  Musculoskeletal:         General: No tenderness  Cervical back: Neck supple  No muscular tenderness  Lymphadenopathy:      Cervical: No cervical adenopathy  Lower Body: No right inguinal adenopathy  No left inguinal adenopathy  Neurological:      Mental Status: She is alert and oriented to person, place, and time  Skin:     General: Skin is warm and dry     Psychiatric:         Mood and Affect: Mood normal          Behavior: Behavior normal             Folliculitis

## 2022-08-04 ENCOUNTER — OFFICE VISIT (OUTPATIENT)
Dept: OBGYN CLINIC | Facility: CLINIC | Age: 47
End: 2022-08-04
Payer: COMMERCIAL

## 2022-08-04 VITALS
HEART RATE: 81 BPM | SYSTOLIC BLOOD PRESSURE: 118 MMHG | HEIGHT: 64 IN | BODY MASS INDEX: 31 KG/M2 | WEIGHT: 181.6 LBS | TEMPERATURE: 97.4 F | DIASTOLIC BLOOD PRESSURE: 70 MMHG

## 2022-08-04 DIAGNOSIS — N64.89 BREAST ASYMMETRY: ICD-10-CM

## 2022-08-04 DIAGNOSIS — L73.9 FOLLICULITIS: ICD-10-CM

## 2022-08-04 DIAGNOSIS — Z12.4 CERVICAL CANCER SCREENING: ICD-10-CM

## 2022-08-04 DIAGNOSIS — Z01.419 ENCNTR FOR GYN EXAM (GENERAL) (ROUTINE) W/O ABN FINDINGS: Primary | ICD-10-CM

## 2022-08-04 PROCEDURE — G0476 HPV COMBO ASSAY CA SCREEN: HCPCS | Performed by: OBSTETRICS & GYNECOLOGY

## 2022-08-04 PROCEDURE — G0145 SCR C/V CYTO,THINLAYER,RESCR: HCPCS | Performed by: OBSTETRICS & GYNECOLOGY

## 2022-08-04 PROCEDURE — S0610 ANNUAL GYNECOLOGICAL EXAMINA: HCPCS | Performed by: OBSTETRICS & GYNECOLOGY

## 2022-08-04 RX ORDER — CHLORHEXIDINE GLUCONATE 4 G/100ML
1 SOLUTION TOPICAL DAILY PRN
Qty: 120 ML | Refills: 3 | Status: SHIPPED | OUTPATIENT
Start: 2022-08-04

## 2022-08-05 LAB
HPV HR 12 DNA CVX QL NAA+PROBE: NEGATIVE
HPV16 DNA CVX QL NAA+PROBE: NEGATIVE
HPV18 DNA CVX QL NAA+PROBE: NEGATIVE

## 2022-08-12 LAB
LAB AP GYN PRIMARY INTERPRETATION: NORMAL
Lab: NORMAL

## 2022-08-18 NOTE — PROGRESS NOTES
Non-Visit Discharge     Ruth Blevins has been seen for 9 visits of PT related to L knee pain  She made limited progress during that time and getting appt c Ortho was discussed  They have not returned following their last appointment and have not responded to rescheduling attempts  They are discharged from our care at this time

## 2022-09-07 ENCOUNTER — HOSPITAL ENCOUNTER (OUTPATIENT)
Dept: RADIOLOGY | Facility: CLINIC | Age: 47
Discharge: HOME | End: 2022-09-07
Payer: COMMERCIAL

## 2022-09-07 VITALS — WEIGHT: 181.6 LBS | HEIGHT: 64 IN | BODY MASS INDEX: 31 KG/M2

## 2022-09-07 DIAGNOSIS — R92.8 ABNORMAL MAMMOGRAM: ICD-10-CM

## 2022-09-07 PROCEDURE — 76642 ULTRASOUND BREAST LIMITED: CPT

## 2022-09-07 PROCEDURE — G0279 TOMOSYNTHESIS, MAMMO: HCPCS

## 2022-09-07 PROCEDURE — 77065 DX MAMMO INCL CAD UNI: CPT

## 2022-09-13 DIAGNOSIS — M79.89 SWELLING OF LOWER LEG: ICD-10-CM

## 2022-09-14 RX ORDER — FUROSEMIDE 20 MG/1
TABLET ORAL
Qty: 90 TABLET | Refills: 1 | Status: SHIPPED | OUTPATIENT
Start: 2022-09-14

## 2022-12-12 DIAGNOSIS — I82.402 DEEP VEIN THROMBOSIS (DVT) OF LEFT LOWER EXTREMITY, UNSPECIFIED CHRONICITY, UNSPECIFIED VEIN (HCC): ICD-10-CM

## 2022-12-12 RX ORDER — RIVAROXABAN 15 MG/1
TABLET, FILM COATED ORAL
Qty: 90 TABLET | Refills: 1 | Status: SHIPPED | OUTPATIENT
Start: 2022-12-12

## 2022-12-20 ENCOUNTER — OFFICE VISIT (OUTPATIENT)
Dept: FAMILY MEDICINE CLINIC | Facility: CLINIC | Age: 47
End: 2022-12-20

## 2022-12-20 VITALS
TEMPERATURE: 97.6 F | DIASTOLIC BLOOD PRESSURE: 71 MMHG | HEART RATE: 67 BPM | OXYGEN SATURATION: 98 % | SYSTOLIC BLOOD PRESSURE: 123 MMHG | BODY MASS INDEX: 30.97 KG/M2 | WEIGHT: 181.4 LBS | HEIGHT: 64 IN

## 2022-12-20 DIAGNOSIS — M79.89 SWELLING OF LOWER LEG: ICD-10-CM

## 2022-12-20 DIAGNOSIS — Z00.00 ANNUAL PHYSICAL EXAM: Primary | ICD-10-CM

## 2022-12-20 DIAGNOSIS — Z86.718 HISTORY OF DVT (DEEP VEIN THROMBOSIS): ICD-10-CM

## 2022-12-20 DIAGNOSIS — R60.9 WATER RETENTION: ICD-10-CM

## 2022-12-20 PROBLEM — I82.402 DEEP VEIN THROMBOSIS (DVT) OF LEFT LOWER EXTREMITY (HCC): Status: RESOLVED | Noted: 2019-07-29 | Resolved: 2022-12-20

## 2022-12-20 NOTE — PROGRESS NOTES
ADULT ANNUAL [facility] PRIMARY CARE    NAME: Edwige Myers  AGE: 49 y o  SEX: female  : 1975     DATE: 2022     Assessment and Plan:     Problem List Items Addressed This Visit        Other    Swelling of lower leg    History of DVT (deep vein thrombosis)   Other Visit Diagnoses     Annual physical exam    -  Primary    Relevant Orders    Comprehensive metabolic panel    Lipid panel    HEMOGLOBIN A1C W/ EAG ESTIMATION    BMI 31 0-31 9,adult        Water retention              Immunizations and preventive care screenings were discussed with patient today  Appropriate education was printed on patient's after visit summary  Counseling:  · Dental Health: discussed importance of regular tooth brushing, flossing, and dental visits  Depression Screening and Follow-up Plan: Patient was screened for depression during today's encounter  They screened negative with a PHQ-2 score of 0  Tobacco Cessation Counseling: Tobacco cessation counseling was not provided  The patient is sincerely urged to quit consumption of tobacco  She is not ready to quit tobacco          Return in 1 year (on 2023)  Chief Complaint:     Chief Complaint   Patient presents with   • Physical Exam   • Care Gap Request     BMI f/u due today      History of Present Illness:     Adult Annual Physical   Patient here for a comprehensive physical exam  The patient reports no problems  Diet and Physical Activity  · Diet/Nutrition: well balanced diet  · Exercise: no formal exercise  Depression Screening  PHQ-2/9 Depression Screening    Little interest or pleasure in doing things: 0 - not at all  Feeling down, depressed, or hopeless: 0 - not at all  PHQ-2 Score: 0  PHQ-2 Interpretation: Negative depression screen       General Health  · Sleep: sleeps well  · Hearing: normal - bilateral   · Vision: no vision problems  · Dental: no dental visits for >1 year  /GYN Health  · Patient is: perimenopausal  · Last menstrual period: May 2022  · Contraceptive method: none  Review of Systems:     Review of Systems   Constitutional: Negative  HENT: Negative  Respiratory: Negative  Cardiovascular: Negative  Gastrointestinal: Negative  Neurological: Negative  All other systems reviewed and are negative  Past Medical History:     Past Medical History:   Diagnosis Date   • Deep vein blood clot of left lower extremity ([address] ) 2019      Past Surgical History:     Past Surgical History:   Procedure Laterality Date   • TUBAL LIGATION  1998   • US BREAST CYST ASPIRATION RIGHT INITIAL Right 07/11/2019      Social History:     Social History     Socioeconomic History   • Marital status: Legally      Spouse name: None   • Number of children: None   • Years of education: None   • Highest education level: None   Occupational History   • None   Tobacco Use   • Smoking status: Every Day     Packs/day: 1 00     Years: 30 00     Pack years: 30 00     Types: Cigarettes     Start date: 12/6/1999   • Smokeless tobacco: Never   Vaping Use   • Vaping Use: Never used   Substance and Sexual Activity   • Alcohol use:  Yes     Alcohol/week: 1 0 standard drink     Types: 1 Cans of beer per week     Comment: occasional   • Drug use: Never   • Sexual activity: Yes     Partners: Male     Birth control/protection: Female Sterilization   Other Topics Concern   • None   Social History Narrative   • None     Social Determinants of Health     Financial Resource Strain: Not on file   Food Insecurity: Not on file   Transportation Needs: Not on file   Physical Activity: Not on file   Stress: Not on file   Social Connections: Not on file   Intimate Partner Violence: Not on file   Housing Stability: Not on file      Family History:     Family History   Problem Relation Age of Onset   • Skin cancer Mother    • Edema Father    • Throat cancer Father    • COPD Father    • Cancer Father • No Known Problems Sister    • No Known Problems Daughter    • No Known Problems Maternal Grandmother    • Stroke Maternal Grandfather    • Alcohol abuse Maternal Grandfather    • No Known Problems Paternal Grandmother    • No Known Problems Paternal Grandfather    • Colon cancer Maternal Uncle    • No Known Problems Paternal Aunt    • No Known Problems Paternal Aunt    • No Known Problems Paternal Aunt    • Breast cancer Neg Hx    • Breast cancer additional onset Neg Hx    • Ovarian cancer Neg Hx    • Endometrial cancer Neg Hx    • BRCA2 Positive Neg Hx    • BRCA2 Negative Neg Hx    • BRCA1 Positive Neg Hx    • BRCA1 Negative Neg Hx    • BRCA 1/2 Neg Hx       Current Medications:     Current Outpatient Medications   Medication Sig Dispense Refill   • chlorhexidine (HIBICLENS) 4 % external liquid Apply 1 application topically daily as needed for wound care (5 nights) 120 mL 3   • furosemide (LASIX) 20 mg tablet TAKE 1 TABLET BY MOUTH EVERY DAY 90 tablet 1   • Xarelto 15 MG tablet TAKE 1 TABLET BY MOUTH DAILY WITH BREAKFAST 90 tablet 1     No current facility-administered medications for this visit  Allergies:     No Known Allergies   Physical Exam:     /71 (BP Location: Left arm, Patient Position: Sitting, Cuff Size: Large)   Pulse 67   Temp 97 6 °F (36 4 °C)   Ht 5' 4" (1 626 m)   Wt 82 3 kg (181 lb 6 4 oz)   SpO2 98%   BMI 31 14 kg/m²     Physical Exam  Vitals and nursing note reviewed  Constitutional:       General: She is not in acute distress  Appearance: Normal appearance  She is well-developed  HENT:      Head: Normocephalic and atraumatic  Eyes:      Conjunctiva/sclera: Conjunctivae normal    Cardiovascular:      Rate and Rhythm: Normal rate and regular rhythm  Heart sounds: No murmur heard  No gallop  Pulmonary:      Effort: Pulmonary effort is normal  No respiratory distress  Breath sounds: Normal breath sounds  Abdominal:      Palpations: Abdomen is soft  Tenderness: There is no abdominal tenderness  Musculoskeletal:      Cervical back: Neck supple  Skin:     General: Skin is warm and dry  Neurological:      General: No focal deficit present  Mental Status: She is alert and oriented to person, place, and time  Mental status is at baseline  Psychiatric:         Mood and Affect: Mood normal          Behavior: Behavior normal          Thought Content:  Thought content normal          Judgment: Judgment normal           GIOVANI Alexandra  [facility]

## 2022-12-20 NOTE — PATIENT INSTRUCTIONS

## 2023-03-02 DIAGNOSIS — M79.89 SWELLING OF LOWER LEG: ICD-10-CM

## 2023-03-02 RX ORDER — FUROSEMIDE 20 MG/1
TABLET ORAL
Qty: 90 TABLET | Refills: 1 | Status: SHIPPED | OUTPATIENT
Start: 2023-03-02

## 2023-03-03 ENCOUNTER — APPOINTMENT (OUTPATIENT)
Dept: LAB | Facility: CLINIC | Age: 48
End: 2023-03-03

## 2023-03-03 DIAGNOSIS — Z00.00 ANNUAL PHYSICAL EXAM: ICD-10-CM

## 2023-03-03 LAB
ALBUMIN SERPL BCP-MCNC: 3.7 G/DL (ref 3.5–5)
ALP SERPL-CCNC: 60 U/L (ref 46–116)
ALT SERPL W P-5'-P-CCNC: 20 U/L (ref 12–78)
ANION GAP SERPL CALCULATED.3IONS-SCNC: 5 MMOL/L (ref 4–13)
AST SERPL W P-5'-P-CCNC: 17 U/L (ref 5–45)
BILIRUB SERPL-MCNC: 0.54 MG/DL (ref 0.2–1)
BUN SERPL-MCNC: 16 MG/DL (ref 5–25)
CALCIUM SERPL-MCNC: 9.5 MG/DL (ref 8.3–10.1)
CHLORIDE SERPL-SCNC: 107 MMOL/L (ref 96–108)
CHOLEST SERPL-MCNC: 191 MG/DL
CO2 SERPL-SCNC: 27 MMOL/L (ref 21–32)
CREAT SERPL-MCNC: 0.99 MG/DL (ref 0.6–1.3)
GFR SERPL CREATININE-BSD FRML MDRD: 67 ML/MIN/1.73SQ M
GLUCOSE P FAST SERPL-MCNC: 111 MG/DL (ref 65–99)
HDLC SERPL-MCNC: 53 MG/DL
LDLC SERPL CALC-MCNC: 105 MG/DL (ref 0–100)
NONHDLC SERPL-MCNC: 138 MG/DL
POTASSIUM SERPL-SCNC: 4 MMOL/L (ref 3.5–5.3)
PROT SERPL-MCNC: 7.1 G/DL (ref 6.4–8.4)
SODIUM SERPL-SCNC: 139 MMOL/L (ref 135–147)
TRIGL SERPL-MCNC: 163 MG/DL

## 2023-03-04 LAB
EST. AVERAGE GLUCOSE BLD GHB EST-MCNC: 114 MG/DL
HBA1C MFR BLD: 5.6 %

## 2023-03-28 DIAGNOSIS — I82.402 DEEP VEIN THROMBOSIS (DVT) OF LEFT LOWER EXTREMITY, UNSPECIFIED CHRONICITY, UNSPECIFIED VEIN (HCC): ICD-10-CM

## 2023-03-28 RX ORDER — RIVAROXABAN 15 MG/1
TABLET, FILM COATED ORAL
Qty: 90 TABLET | Refills: 1 | Status: SHIPPED | OUTPATIENT
Start: 2023-03-28

## 2023-05-17 NOTE — PROGRESS NOTES
PT Evaluation     Today's date: 2022  Patient name: Miguelina Carreno  : 1975  MRN: [id number]  Referring provider: Lorena Louise, *  Dx:   Encounter Diagnosis     ICD-10-CM    1  Acute pain of left knee  M25 [id number] Ambulatory Referral to Physical Therapy       Start Time:   Stop Time: 164  Total time in clinic (min): 60 minutes    Assessment  Assessment details: Miguelina Carreno is a pleasant 49 y o  female presenting to PT with cc of L Knee pain  Pt  States pain began 6 months ago  Upon examination, patient was found to have objective deficits as listed below and is displaying ss consistent with possible R knee meniscus derangement  Pt  Is experiencing subsequent functional deficits including decreased/painful MMT, Medial jointline tenderness, and (+) Shital's  Pt  Was educated on role of PT to address issues and HEP  Pt  Would benefit from skilled physical therapy to promote improved function and maximize activity tolerance  Impairments: abnormal gait, abnormal muscle firing, activity intolerance and pain with function  Understanding of Dx/Px/POC: good   Prognosis: good    Goals  In 3 weeks patient will:   Patient to be independent with HEP to maximize improvement in functional mobility    Decrease right Knee pain to 3 on a scale of 0-10 to allow dressing and improved stair climbing     Patient will demonstrate 15 minutes standing tolerance with less than 3/10 pain     Long Term Functional Goals (Goals for patient at discharge):    In 6 weeks patient will:   Improve functional mobility: Patient will stand for 30 minutes and walk 2 blocks with less than 3/10 pain    Patient will ascend/descend 1 flight of stairs with less than 2/10 pain    pt will score at or above predicted discharge FOTO score of 70 to reflect improvement in subjective functional ability         Plan  Patient would benefit from: skilled physical therapy and PT eval  Referral necessary: No  Planned modality interventions: cryotherapy  Planned therapy interventions: manual therapy, neuromuscular re-education, patient education, therapeutic activities, therapeutic exercise and home exercise program  Frequency: 2x week  Duration in weeks: 6  Plan of Care beginning date: 2022  Plan of Care expiration date: 2022  Treatment plan discussed with: patient        Subjective Evaluation    History of Present Illness  Date of onset: 2022  Mechanism of injury: Initially had R knee pain and L foot pain  Took steroid dose pack and and those improved but L knee pain started  Thought it would improve, but it hasn't  Symp fluctuate daily  Not a recurrent problem   Quality of life: good    Pain  Current pain rating: 3  At best pain ratin  At worst pain ratin  Quality: throbbing, dull ache and sharp  Relieving factors: ice  Aggravating factors: stair climbing, walking and running  Progression: worsening    Social Support  Steps to enter house: yes  Stairs in house: yes   Lives in: multiple-level home    Employment status: working        Objective     Tenderness     Right Knee   Tenderness in the medial joint line       Passive Range of Motion   Left Knee   Flexion: 130 degrees   Extension: 0 degrees     Right Knee   Normal passive range of motion    Strength/Myotome Testing     Left Knee   Flexion: 4  Prone flexion: 4  Quadriceps contraction: good    Right Knee   Normal strength    General Comments:      Knee Comments  Pre-tibial edema 3+ 65 sec refill  30 Sec Sq Test - 13 reps  SLS Balance - R: 30"  L: 11"               Precautions: none     Daily Treatment Diary:      Initial Evaluation Date: 22  Compliance                      Visit Number                     Re-Eval  IE                 MC   Foto Captured                                                 Manual                                                                                        Ther-Ex                      4-way SLR x10 ea Prone TKE 10x5"                     SLS A/P tap x20                     Side step down 6in x10                     SLS balance                                                                                                                                    Neuro Re-Ed                                                                                                Ther-Act                                                               Modalities Structured MSE

## 2023-09-01 DIAGNOSIS — M79.89 SWELLING OF LOWER LEG: ICD-10-CM

## 2023-09-03 RX ORDER — FUROSEMIDE 20 MG/1
TABLET ORAL
Qty: 90 TABLET | Refills: 1 | OUTPATIENT
Start: 2023-09-03

## 2023-09-04 DIAGNOSIS — M79.89 SWELLING OF LOWER LEG: ICD-10-CM

## 2023-09-04 RX ORDER — FUROSEMIDE 20 MG/1
20 TABLET ORAL DAILY
Qty: 90 TABLET | Refills: 1 | Status: SHIPPED | OUTPATIENT
Start: 2023-09-04

## 2023-11-29 DIAGNOSIS — I82.402 DEEP VEIN THROMBOSIS (DVT) OF LEFT LOWER EXTREMITY, UNSPECIFIED CHRONICITY, UNSPECIFIED VEIN (HCC): ICD-10-CM

## 2023-11-29 RX ORDER — RIVAROXABAN 15 MG/1
TABLET, FILM COATED ORAL
Qty: 90 TABLET | Refills: 1 | OUTPATIENT
Start: 2023-11-29

## 2023-12-21 ENCOUNTER — OFFICE VISIT (OUTPATIENT)
Dept: FAMILY MEDICINE CLINIC | Facility: CLINIC | Age: 48
End: 2023-12-21
Payer: COMMERCIAL

## 2023-12-21 VITALS
HEIGHT: 64 IN | OXYGEN SATURATION: 99 % | DIASTOLIC BLOOD PRESSURE: 82 MMHG | TEMPERATURE: 98 F | SYSTOLIC BLOOD PRESSURE: 124 MMHG | BODY MASS INDEX: 33.12 KG/M2 | WEIGHT: 194 LBS | HEART RATE: 61 BPM

## 2023-12-21 DIAGNOSIS — Z00.00 ANNUAL PHYSICAL EXAM: Primary | ICD-10-CM

## 2023-12-21 DIAGNOSIS — Z12.11 SCREENING FOR COLON CANCER: ICD-10-CM

## 2023-12-21 DIAGNOSIS — Z12.31 ENCOUNTER FOR SCREENING MAMMOGRAM FOR MALIGNANT NEOPLASM OF BREAST: ICD-10-CM

## 2023-12-21 DIAGNOSIS — J34.89 SINUS PRESSURE: ICD-10-CM

## 2023-12-21 DIAGNOSIS — R09.81 SINUS CONGESTION: ICD-10-CM

## 2023-12-21 PROBLEM — M79.89 SWELLING OF LOWER LEG: Status: RESOLVED | Noted: 2022-12-20 | Resolved: 2023-12-21

## 2023-12-21 PROCEDURE — 99396 PREV VISIT EST AGE 40-64: CPT | Performed by: NURSE PRACTITIONER

## 2023-12-21 RX ORDER — PREDNISONE 20 MG/1
20 TABLET ORAL DAILY
Qty: 5 TABLET | Refills: 0 | Status: SHIPPED | OUTPATIENT
Start: 2023-12-21 | End: 2023-12-26

## 2023-12-21 NOTE — PROGRESS NOTES
ADULT ANNUAL PHYSICAL  [facility]    NAME: Arleth Shah  AGE: 48 y.o. SEX: female  : 1975     DATE: 2023     Assessment and Plan:     Problem List Items Addressed This Visit    None  Visit Diagnoses     Annual physical exam    -  Primary    BMI 33.0-33.9,adult        Sinus pressure        Relevant Medications    predniSONE 20 mg tablet    Sinus congestion        Relevant Medications    predniSONE 20 mg tablet    Encounter for screening mammogram for malignant neoplasm of breast        Relevant Orders    Mammo screening bilateral w 3d & cad    Screening for colon cancer        Relevant Orders    Cologuard          Immunizations and preventive care screenings were discussed with patient today. Appropriate education was printed on patient's after visit summary.    Counseling:  Menopause    BMI Counseling: Body mass index is 33.3 kg/m². The BMI is above normal. Nutrition recommendations include encouraging healthy choices of fruits and vegetables. Exercise recommendations include moderate physical activity 150 minutes/week. Rationale for BMI follow-up plan is due to patient being overweight or obese.     Depression Screening and Follow-up Plan: Patient was screened for depression during today's encounter. They screened negative with a PHQ-2 score of 0.    Tobacco Cessation Counseling: Tobacco cessation counseling was not provided. The patient is sincerely urged to quit consumption of tobacco. She is not ready to quit tobacco.     Prednisone prescribed for 5 days due to sinus pressure/congestion.          Return in 1 year (on 2024).     Chief Complaint:     Chief Complaint   Patient presents with   • Physical Exam      History of Present Illness:     Adult Annual Physical   Patient here for a comprehensive physical exam. The patient reports recent sinus congestion/pressure for the past week - does not feel overtly ill but sx are not resolving.  Reports some bloating due to menopause - occurs with eating - has improved though since starting a probiotic.      Diet and Physical Activity  Diet/Nutrition: well balanced diet.   Exercise: no formal exercise.      Depression Screening  PHQ-2/9 Depression Screening    Little interest or pleasure in doing things: 0 - not at all  Feeling down, depressed, or hopeless: 0 - not at all  PHQ-2 Score: 0  PHQ-2 Interpretation: Negative depression screen       General Health  Sleep: sleeps well.   Hearing: normal - bilateral.  Vision: no vision problems.   Dental: brushes teeth twice daily.       /GYN Health  Follows with gynecology? yes   Patient is: postmenopausal  Last menstrual period: 1.5 years ago  Contraceptive method:  none .    Advanced Care Planning  Do you have an advanced directive? no  Do you have a durable medical power of ? no     Review of Systems:     Review of Systems   Constitutional: Negative.    HENT:  Positive for congestion, sinus pressure and sinus pain.    Respiratory: Negative.     Cardiovascular: Negative.    Gastrointestinal: Negative.    Neurological: Negative.    All other systems reviewed and are negative.     Past Medical History:     Past Medical History:   Diagnosis Date   • Deep vein blood clot of left lower extremity (HCC) 2019      Past Surgical History:     Past Surgical History:   Procedure Laterality Date   • TUBAL LIGATION  1998   • US BREAST CYST ASPIRATION RIGHT INITIAL Right 07/11/2019      Social History:     Social History     Socioeconomic History   • Marital status: Legally      Spouse name: None   • Number of children: None   • Years of education: None   • Highest education level: None   Occupational History   • None   Tobacco Use   • Smoking status: Every Day     Current packs/day: 1.00     Average packs/day: 1 pack/day for 30.0 years (30.0 ttl pk-yrs)     Types: Cigarettes     Start date: 12/6/1999   • Smokeless tobacco: Never   Vaping Use   • Vaping  status: Never Used   Substance and Sexual Activity   • Alcohol use: Yes     Alcohol/week: 1.0 standard drink of alcohol     Types: 1 Cans of beer per week     Comment: occasional   • Drug use: Never   • Sexual activity: Yes     Partners: Male     Birth control/protection: Female Sterilization   Other Topics Concern   • None   Social History Narrative   • None     Social Determinants of Health     Financial Resource Strain: Not on file   Food Insecurity: Not on file   Transportation Needs: Not on file   Physical Activity: Not on file   Stress: Not on file   Social Connections: Not on file   Intimate Partner Violence: Not on file   Housing Stability: Not on file      Family History:     Family History   Problem Relation Age of Onset   • Skin cancer Mother    • Edema Father    • Throat cancer Father    • COPD Father    • Cancer Father    • No Known Problems Sister    • No Known Problems Daughter    • No Known Problems Maternal Grandmother    • Stroke Maternal Grandfather    • Alcohol abuse Maternal Grandfather    • No Known Problems Paternal Grandmother    • No Known Problems Paternal Grandfather    • Colon cancer Maternal Uncle    • No Known Problems Paternal Aunt    • No Known Problems Paternal Aunt    • No Known Problems Paternal Aunt    • Breast cancer Neg Hx    • Breast cancer additional onset Neg Hx    • Ovarian cancer Neg Hx    • Endometrial cancer Neg Hx    • BRCA2 Positive Neg Hx    • BRCA2 Negative Neg Hx    • BRCA1 Positive Neg Hx    • BRCA1 Negative Neg Hx    • BRCA 1/2 Neg Hx       Current Medications:     Current Outpatient Medications   Medication Sig Dispense Refill   • furosemide (LASIX) 20 mg tablet Take 1 tablet (20 mg total) by mouth daily 90 tablet 1   • predniSONE 20 mg tablet Take 1 tablet (20 mg total) by mouth daily for 5 days 5 tablet 0   • rivaroxaban (Xarelto) 15 mg tablet Take 1 tablet (15 mg total) by mouth daily with breakfast 90 tablet 1     No current facility-administered medications  "for this visit.      Allergies:     No Known Allergies   Physical Exam:     /82 (BP Location: Left arm, Patient Position: Sitting, Cuff Size: Standard)   Pulse 61   Temp 98 °F (36.7 °C)   Ht 5' 4\" (1.626 m)   Wt 88 kg (194 lb)   SpO2 99%   BMI 33.30 kg/m²     Physical Exam  Vitals reviewed.   Constitutional:       Appearance: Normal appearance.   Cardiovascular:      Rate and Rhythm: Normal rate and regular rhythm.   Pulmonary:      Effort: Pulmonary effort is normal.      Breath sounds: Normal breath sounds.   Neurological:      Mental Status: She is alert.          GIOVANI Alexandra  [facility]    "

## 2023-12-26 ENCOUNTER — TELEPHONE (OUTPATIENT)
Dept: FAMILY MEDICINE CLINIC | Facility: CLINIC | Age: 48
End: 2023-12-26

## 2023-12-26 DIAGNOSIS — R09.81 SINUS CONGESTION: ICD-10-CM

## 2023-12-26 DIAGNOSIS — J34.89 SINUS PRESSURE: Primary | ICD-10-CM

## 2023-12-26 RX ORDER — AMOXICILLIN 500 MG/1
500 CAPSULE ORAL EVERY 12 HOURS SCHEDULED
Qty: 14 CAPSULE | Refills: 0 | Status: SHIPPED | OUTPATIENT
Start: 2023-12-26 | End: 2024-01-02

## 2023-12-26 NOTE — TELEPHONE ENCOUNTER
Patient was seen in office on 12/21 was told to call office if symptoms did not improve, patient now has a cough, has lost her voice, and has discharge coming from both eyes. Please advise

## 2023-12-29 ENCOUNTER — HOSPITAL ENCOUNTER (OUTPATIENT)
Dept: RADIOLOGY | Facility: CLINIC | Age: 48
End: 2023-12-29
Payer: COMMERCIAL

## 2023-12-29 VITALS — HEIGHT: 64 IN | WEIGHT: 192 LBS | BODY MASS INDEX: 32.78 KG/M2

## 2023-12-29 DIAGNOSIS — Z12.31 ENCOUNTER FOR SCREENING MAMMOGRAM FOR MALIGNANT NEOPLASM OF BREAST: ICD-10-CM

## 2023-12-29 PROCEDURE — 77063 BREAST TOMOSYNTHESIS BI: CPT

## 2023-12-29 PROCEDURE — 77067 SCR MAMMO BI INCL CAD: CPT

## 2024-01-04 LAB — COLOGUARD RESULT REPORTABLE: POSITIVE

## 2024-01-05 DIAGNOSIS — R19.5 POSITIVE COLORECTAL CANCER SCREENING USING COLOGUARD TEST: Primary | ICD-10-CM

## 2024-01-08 ENCOUNTER — TELEPHONE (OUTPATIENT)
Age: 49
End: 2024-01-08

## 2024-02-22 DIAGNOSIS — M79.89 SWELLING OF LOWER LEG: ICD-10-CM

## 2024-02-23 RX ORDER — FUROSEMIDE 20 MG/1
20 TABLET ORAL DAILY
Qty: 90 TABLET | Refills: 1 | Status: SHIPPED | OUTPATIENT
Start: 2024-02-23

## 2024-03-01 ENCOUNTER — TELEPHONE (OUTPATIENT)
Dept: GASTROENTEROLOGY | Facility: CLINIC | Age: 49
End: 2024-03-01

## 2024-03-01 ENCOUNTER — CONSULT (OUTPATIENT)
Dept: GASTROENTEROLOGY | Facility: CLINIC | Age: 49
End: 2024-03-01
Payer: COMMERCIAL

## 2024-03-01 VITALS
TEMPERATURE: 97.6 F | DIASTOLIC BLOOD PRESSURE: 82 MMHG | BODY MASS INDEX: 32.17 KG/M2 | SYSTOLIC BLOOD PRESSURE: 124 MMHG | WEIGHT: 188.4 LBS | HEIGHT: 64 IN

## 2024-03-01 DIAGNOSIS — R19.5 POSITIVE COLORECTAL CANCER SCREENING USING COLOGUARD TEST: ICD-10-CM

## 2024-03-01 DIAGNOSIS — R14.0 ABDOMINAL BLOATING: Primary | ICD-10-CM

## 2024-03-01 PROCEDURE — 99244 OFF/OP CNSLTJ NEW/EST MOD 40: CPT | Performed by: PHYSICIAN ASSISTANT

## 2024-03-01 NOTE — PATIENT INSTRUCTIONS
Scheduled date of EGD/colonoscopy (as of today):03/26/2024  Physician performing EGD/colonoscopy:Jayant  Location of EGD/colonoscopy: [place]   Desired bowel prep reviewed with patient:Adrien Rothman  Instructions reviewed with patient by: Ying   Clearances:  Xarelto sent for clearance    Patient is also scheduled for a 3 month follow up  06/18/2024 at 10:30 am with Vivienne Fuentes

## 2024-03-01 NOTE — TELEPHONE ENCOUNTER
Tried to reach patient, no answer or machine. Note sent to surgeons office to also make them aware of instructions and that we were unable to reach patient

## 2024-03-01 NOTE — PROGRESS NOTES
"[facility] - Outpatient Consultation  Arleth Shah 48 y.o. female MRN: [id number]  Encounter: [id number]          ASSESSMENT AND PLAN:       Arleth is a 47 y/o female with hx of DVT on xarelto who presents for colon cancer screening.     Positive cologuard  Cologuard 12/2023 WNL. No prior colonoscopy. She denies immediate family hx of colon cancer  but says she has an uncle with this. Pt denies constitutional symptoms, overt GI bleeding or changes in bowel habits.   -colonoscopy ordered; instructions given; risks and benefits reviewed     2. Abdominal bloating  Pt says ever since menopause, she has been getting bloated several times/week but notes that apple-cider gummies alleviates this. She says she would prefer to do an EGD to ensure \"nothing else\" is going on.   -EGD ordered to rule out celiac disease, H.pylori, esophagitis/gastritis   -continue apple-cider gummies PRN  ______________________________________________________________________    HPI:  Arleth is a 47 y/o female with hx of DVT on xarelto who presents for colon cancer screening. Pt says ever since menopause, she has been getting bloated several times/week but notes that apple-cider gummies alleviates this. She says she would prefer to do an EGD to ensure \"nothing else\" is going on. She denies heartburn, abdominal pain, n/v, trouble or painful swallowing, constipation, diarrhea, unintentional weight loss, fevers, chills, night sweats, NSAID use, bloody or black BM. She denies immediate family hx of colon cancer  but says she has an uncle with this. Pt is on xarelto.       REVIEW OF SYSTEMS:    CONSTITUTIONAL: Denies any fever, chills, rigors, and weight loss.  HEENT: No earache or tinnitus. Denies hearing loss or visual disturbances.  CARDIOVASCULAR: No chest pain or palpitations.   RESPIRATORY: Denies any cough, hemoptysis, shortness of breath or dyspnea on exertion.  GASTROINTESTINAL: As noted in the History of Present " Illness.   GENITOURINARY: No problems with urination. Denies any hematuria or dysuria.  NEUROLOGIC: No dizziness or vertigo, denies headaches.   MUSCULOSKELETAL: Denies any muscle or joint pain.   SKIN: Denies skin rashes or itching.   ENDOCRINE: Denies excessive thirst. Denies intolerance to heat or cold.  PSYCHOSOCIAL: Denies depression or anxiety. Denies any recent memory loss.       Historical Information   Past Medical History:   Diagnosis Date    Deep vein blood clot of left lower extremity (HCC) 2019     Past Surgical History:   Procedure Laterality Date    TUBAL LIGATION  1998     BREAST CYST ASPIRATION RIGHT INITIAL Right 07/11/2019     Social History   Social History     Substance and Sexual Activity   Alcohol Use Yes    Alcohol/week: 1.0 standard drink of alcohol    Types: 1 Cans of beer per week    Comment: occasional     Social History     Substance and Sexual Activity   Drug Use Never     Social History     Tobacco Use   Smoking Status Every Day    Current packs/day: 1.00    Average packs/day: 1 pack/day for 30.0 years (30.0 ttl pk-yrs)    Types: Cigarettes    Start date: 12/6/1999   Smokeless Tobacco Never     Family History   Problem Relation Age of Onset    Skin cancer Mother     Edema Father     Throat cancer Father     COPD Father     Cancer Father     No Known Problems Sister     No Known Problems Daughter     No Known Problems Maternal Grandmother     Stroke Maternal Grandfather     Alcohol abuse Maternal Grandfather     No Known Problems Paternal Grandmother     No Known Problems Paternal Grandfather     Colon cancer Maternal Uncle     No Known Problems Paternal Aunt     No Known Problems Paternal Aunt     No Known Problems Paternal Aunt     Breast cancer Neg Hx     Breast cancer additional onset Neg Hx     Ovarian cancer Neg Hx     Endometrial cancer Neg Hx     BRCA2 Positive Neg Hx     BRCA2 Negative Neg Hx     BRCA1 Positive Neg Hx     BRCA1 Negative Neg Hx     BRCA 1/2 Neg Hx         Meds/Allergies       Current Outpatient Medications:     furosemide (LASIX) 20 mg tablet    rivaroxaban (Xarelto) 15 mg tablet    No Known Allergies        Objective     Last menstrual period 05/22/2022. There is no height or weight on file to calculate BMI.        PHYSICAL EXAM:      General Appearance:   Alert, cooperative, no distress   HEENT:   Normocephalic, atraumatic, anicteric.     Neck:  Supple, symmetrical, trachea midline   Lungs:   Clear to auscultation bilaterally; no rales, rhonchi or wheezing; respirations unlabored    Heart::   Regular rate and rhythm; no murmur, rub, or gallop.   Abdomen:   Soft, non-tender, non-distended; normal bowel sounds; no masses, no organomegaly    Genitalia:   Deferred    Rectal:   Deferred    Extremities:  No cyanosis, clubbing or edema    Pulses:  2+ and symmetric    Skin:  No jaundice, rashes, or lesions    Lymph nodes:  No palpable cervical lymphadenopathy        Lab Results:   No visits with results within 1 Day(s) from this visit.   Latest known visit with results is:   Office Visit on 12/21/2023   Component Date Value    Cologuard Result 12/28/2023 Positive (A)          Radiology Results:   No results found.

## 2024-03-01 NOTE — TELEPHONE ENCOUNTER
Our mutual patient is scheduled for procedure: colonoscopy and EGD    On: March 26 , 2024     With: Dr. Panfilo Saba, DO    He/She is taking the following blood thinner: Xarelto (Rivaroxaban)    Can this be stopped  2 days prior to the procedure    Physician Approving clearance:   Please review and approve ASAP    Thank you

## 2024-03-19 ENCOUNTER — TELEPHONE (OUTPATIENT)
Age: 49
End: 2024-03-19

## 2024-03-26 ENCOUNTER — ANESTHESIA EVENT (OUTPATIENT)
Dept: GASTROENTEROLOGY | Facility: HOSPITAL | Age: 49
End: 2024-03-26

## 2024-03-26 ENCOUNTER — ANESTHESIA (OUTPATIENT)
Dept: GASTROENTEROLOGY | Facility: HOSPITAL | Age: 49
End: 2024-03-26

## 2024-03-26 ENCOUNTER — HOSPITAL ENCOUNTER (OUTPATIENT)
Dept: GASTROENTEROLOGY | Facility: HOSPITAL | Age: 49
Setting detail: OUTPATIENT SURGERY
Discharge: HOME | End: 2024-03-26
Payer: COMMERCIAL

## 2024-03-26 VITALS
RESPIRATION RATE: 18 BRPM | DIASTOLIC BLOOD PRESSURE: 60 MMHG | HEART RATE: 64 BPM | SYSTOLIC BLOOD PRESSURE: 114 MMHG | OXYGEN SATURATION: 97 % | WEIGHT: 188 LBS | BODY MASS INDEX: 32.1 KG/M2 | HEIGHT: 64 IN | TEMPERATURE: 97.4 F

## 2024-03-26 DIAGNOSIS — K22.10 EROSIVE ESOPHAGITIS: ICD-10-CM

## 2024-03-26 DIAGNOSIS — K25.9 GASTRIC ULCER WITHOUT HEMORRHAGE OR PERFORATION, UNSPECIFIED CHRONICITY: Primary | ICD-10-CM

## 2024-03-26 DIAGNOSIS — R14.0 ABDOMINAL BLOATING: ICD-10-CM

## 2024-03-26 DIAGNOSIS — R19.5 POSITIVE COLORECTAL CANCER SCREENING USING COLOGUARD TEST: ICD-10-CM

## 2024-03-26 PROCEDURE — 88342 IMHCHEM/IMCYTCHM 1ST ANTB: CPT | Performed by: STUDENT IN AN ORGANIZED HEALTH CARE EDUCATION/TRAINING PROGRAM

## 2024-03-26 PROCEDURE — 88313 SPECIAL STAINS GROUP 2: CPT | Performed by: STUDENT IN AN ORGANIZED HEALTH CARE EDUCATION/TRAINING PROGRAM

## 2024-03-26 PROCEDURE — 88341 IMHCHEM/IMCYTCHM EA ADD ANTB: CPT | Performed by: STUDENT IN AN ORGANIZED HEALTH CARE EDUCATION/TRAINING PROGRAM

## 2024-03-26 PROCEDURE — 88305 TISSUE EXAM BY PATHOLOGIST: CPT | Performed by: STUDENT IN AN ORGANIZED HEALTH CARE EDUCATION/TRAINING PROGRAM

## 2024-03-26 RX ORDER — GLYCOPYRROLATE 0.2 MG/ML
INJECTION INTRAMUSCULAR; INTRAVENOUS AS NEEDED
Status: DISCONTINUED | OUTPATIENT
Start: 2024-03-26 | End: 2024-03-26

## 2024-03-26 RX ORDER — SODIUM CHLORIDE, SODIUM LACTATE, POTASSIUM CHLORIDE, CALCIUM CHLORIDE 600; 310; 30; 20 MG/100ML; MG/100ML; MG/100ML; MG/100ML
125 INJECTION, SOLUTION INTRAVENOUS CONTINUOUS
Status: DISCONTINUED | OUTPATIENT
Start: 2024-03-26 | End: 2024-03-30 | Stop reason: HOSPADM

## 2024-03-26 RX ORDER — OMEPRAZOLE 20 MG/1
20 CAPSULE, DELAYED RELEASE ORAL DAILY
Qty: 90 CAPSULE | Refills: 3 | Status: SHIPPED | OUTPATIENT
Start: 2024-03-26

## 2024-03-26 RX ORDER — LIDOCAINE HYDROCHLORIDE 20 MG/ML
INJECTION, SOLUTION EPIDURAL; INFILTRATION; INTRACAUDAL; PERINEURAL AS NEEDED
Status: DISCONTINUED | OUTPATIENT
Start: 2024-03-26 | End: 2024-03-26

## 2024-03-26 RX ORDER — FENTANYL CITRATE 50 UG/ML
INJECTION, SOLUTION INTRAMUSCULAR; INTRAVENOUS AS NEEDED
Status: DISCONTINUED | OUTPATIENT
Start: 2024-03-26 | End: 2024-03-26

## 2024-03-26 RX ORDER — PROPOFOL 10 MG/ML
INJECTION, EMULSION INTRAVENOUS AS NEEDED
Status: DISCONTINUED | OUTPATIENT
Start: 2024-03-26 | End: 2024-03-26

## 2024-03-26 RX ADMIN — FENTANYL CITRATE 25 MCG: 50 INJECTION INTRAMUSCULAR; INTRAVENOUS at 12:21

## 2024-03-26 RX ADMIN — GLYCOPYRROLATE 0.2 MG: 0.2 INJECTION, SOLUTION INTRAMUSCULAR; INTRAVENOUS at 11:29

## 2024-03-26 RX ADMIN — PROPOFOL 140 MCG/KG/MIN: 10 INJECTION, EMULSION INTRAVENOUS at 11:36

## 2024-03-26 RX ADMIN — FENTANYL CITRATE 25 MCG: 50 INJECTION INTRAMUSCULAR; INTRAVENOUS at 12:13

## 2024-03-26 RX ADMIN — SODIUM CHLORIDE, SODIUM LACTATE, POTASSIUM CHLORIDE, AND CALCIUM CHLORIDE 125 ML/HR: .6; .31; .03; .02 INJECTION, SOLUTION INTRAVENOUS at 09:45

## 2024-03-26 RX ADMIN — LIDOCAINE HYDROCHLORIDE 30 MG: 20 INJECTION, SOLUTION EPIDURAL; INFILTRATION; INTRACAUDAL; PERINEURAL at 11:35

## 2024-03-26 RX ADMIN — PROPOFOL 100 MG: 10 INJECTION, EMULSION INTRAVENOUS at 11:35

## 2024-03-26 NOTE — ANESTHESIA POSTPROCEDURE EVALUATION
Post-Op Assessment Note    CV Status:  Stable  Pain Score: 0    Pain management: adequate       Mental Status:  Alert and awake   Hydration Status:  Euvolemic   PONV Controlled:  Controlled   Airway Patency:  Patent     Post Op Vitals Reviewed: Yes    No anethesia notable event occurred.    Staff: CRNA               BP   99/58   Temp   97   Pulse  67   Resp   14   SpO2   99

## 2024-03-26 NOTE — ANESTHESIA PREPROCEDURE EVALUATION
Procedure:  EGD  COLONOSCOPY    Relevant Problems   No relevant active problems        Physical Exam    Airway    Mallampati score: I  TM Distance: >3 FB  Neck ROM: full     Dental       Cardiovascular  Cardiovascular exam normal    Pulmonary  Pulmonary exam normal     Other Findings  post-pubertal.      Anesthesia Plan  ASA Score- 1     Anesthesia Type- IV sedation with anesthesia with ASA Monitors.         Additional Monitors:     Airway Plan:            Plan Factors-Exercise tolerance (METS): >4 METS.    Chart reviewed. EKG reviewed. Imaging results reviewed. Existing labs reviewed. Patient summary reviewed.                  Induction- intravenous.    Postoperative Plan- Plan for postoperative opioid use. Planned trial extubation    Informed Consent- Anesthetic plan and risks discussed with patient.  I personally reviewed this patient with the CRNA. Discussed and agreed on the Anesthesia Plan with the CRNA..

## 2024-03-26 NOTE — INTERVAL H&P NOTE
H&P reviewed. After examining the patient I find no changes in the patients condition since the H&P had been written.  Patient had a positive Cologuard.  Denies any change in her bowel pattern or rectal bleeding.  Her last dose of Xarelto was Saturday evening.  She does report some bloating.  Denies any early satiety.  She does use apple cider vinegar couple times a day to help with this.  She has not had a prior colonoscopy.  She is stable for EGD and colonoscopy.  Electronic permission was obtained for colonoscopy.  We did have trouble believing that and she signed a paper copy for consent for an EGD and colonoscopy.    Vitals:    03/26/24 0925   BP: 141/68   Pulse: 62   Resp: 18   Temp: (!) 97.3 °F (36.3 °C)   SpO2: 100%

## 2024-03-29 PROCEDURE — 88341 IMHCHEM/IMCYTCHM EA ADD ANTB: CPT | Performed by: STUDENT IN AN ORGANIZED HEALTH CARE EDUCATION/TRAINING PROGRAM

## 2024-03-29 PROCEDURE — 88342 IMHCHEM/IMCYTCHM 1ST ANTB: CPT | Performed by: STUDENT IN AN ORGANIZED HEALTH CARE EDUCATION/TRAINING PROGRAM

## 2024-03-29 PROCEDURE — 88313 SPECIAL STAINS GROUP 2: CPT | Performed by: STUDENT IN AN ORGANIZED HEALTH CARE EDUCATION/TRAINING PROGRAM

## 2024-03-29 PROCEDURE — 88305 TISSUE EXAM BY PATHOLOGIST: CPT | Performed by: STUDENT IN AN ORGANIZED HEALTH CARE EDUCATION/TRAINING PROGRAM

## 2024-04-02 ENCOUNTER — TELEPHONE (OUTPATIENT)
Dept: GASTROENTEROLOGY | Facility: CLINIC | Age: 49
End: 2024-04-02

## 2024-04-02 DIAGNOSIS — R10.13 DYSPEPSIA: Primary | ICD-10-CM

## 2024-04-02 RX ORDER — FAMOTIDINE 40 MG/1
40 TABLET, FILM COATED ORAL
Qty: 90 TABLET | Refills: 3 | Status: SHIPPED | OUTPATIENT
Start: 2024-04-02

## 2024-04-02 NOTE — TELEPHONE ENCOUNTER
Our mutual patient is scheduled for procedure: EGD    On: 08/07/2024     With: Dr. Saba    He/She is taking the following blood thinner: Xarelto     Can this be stopped 2 days prior to the procedure?      Physician Approving clearance: MICHAEL

## 2024-04-02 NOTE — RESULT ENCOUNTER NOTE
I informed patient that biopsies from the stomach were benign and negative for H. pylori.  Biopsies from the region of the ulcer did reveal some atrophic gastritis and intestinal metaplasia.  Patient is now on omeprazole 20 mg daily.  She continues to have some dyspeptic symptoms and feeling full after eating although there is some improvement.  After discussion with her, we decided to try famotidine 40 mg 2 hours before bed and to continue the omeprazole 20 mg daily given the presence of an ulcer.  Please arrange for repeat EGD for sometime in July or August with me.  This will be after her appointment with Vivienne on June 18.    I reviewed colonoscopy biopsy results.  At least 5 polyps were serrated adenomas 2 of them were a centimeter or larger.  There were several other hyperplastic polyps.  Based upon these findings I suspect she has a serrated polyposis syndrome.  She did have 2 tubular adenomas and the polyp at 15 cm was a tubulovillous adenoma.  Based upon these findings she should undergo repeat colonoscopy in 6 to 8 months.  She should have an office appointment prior to the colonoscopy as she is on Xarelto.  I also advised her to quit smoking, she used to smoke cigarettes, she has been vaping nicotine and is trying to stop.  I did explain that tobacco use has been associated with increased risk of colorectal cancer and colon polyps.  I will copy Jayant as an FYI.  Thank you

## 2024-05-24 DIAGNOSIS — I82.402 DEEP VEIN THROMBOSIS (DVT) OF LEFT LOWER EXTREMITY, UNSPECIFIED CHRONICITY, UNSPECIFIED VEIN (HCC): ICD-10-CM

## 2024-06-18 ENCOUNTER — TELEPHONE (OUTPATIENT)
Age: 49
End: 2024-06-18

## 2024-06-18 ENCOUNTER — OFFICE VISIT (OUTPATIENT)
Dept: GASTROENTEROLOGY | Facility: CLINIC | Age: 49
End: 2024-06-18
Payer: COMMERCIAL

## 2024-06-18 VITALS
TEMPERATURE: 97.7 F | DIASTOLIC BLOOD PRESSURE: 72 MMHG | WEIGHT: 189 LBS | HEIGHT: 64 IN | BODY MASS INDEX: 32.27 KG/M2 | SYSTOLIC BLOOD PRESSURE: 124 MMHG

## 2024-06-18 DIAGNOSIS — K25.9 GASTRIC ULCER WITHOUT HEMORRHAGE OR PERFORATION, UNSPECIFIED CHRONICITY: ICD-10-CM

## 2024-06-18 DIAGNOSIS — R14.0 ABDOMINAL BLOATING: Primary | ICD-10-CM

## 2024-06-18 DIAGNOSIS — K22.10 EROSIVE ESOPHAGITIS: ICD-10-CM

## 2024-06-18 PROCEDURE — 99214 OFFICE O/P EST MOD 30 MIN: CPT | Performed by: PHYSICIAN ASSISTANT

## 2024-06-18 RX ORDER — OMEPRAZOLE 20 MG/1
20 CAPSULE, DELAYED RELEASE ORAL 2 TIMES DAILY
Qty: 180 CAPSULE | Refills: 1 | Status: SHIPPED | OUTPATIENT
Start: 2024-06-18

## 2024-06-18 RX ORDER — SIMETHICONE 180 MG
180 CAPSULE ORAL EVERY 6 HOURS PRN
Qty: 60 CAPSULE | Refills: 2 | Status: SHIPPED | OUTPATIENT
Start: 2024-06-18

## 2024-06-18 NOTE — TELEPHONE ENCOUNTER
PA for omeprazole bid    Submitted via    []CMM-KEY   [x]Surescrijer-Case ID #   Case ID: [id number]   Payer: [employer] [place]   Phone: [phone]   Fax: [phone]     []Faxed to plan   []Other website   []Phone call Case ID #     Office notes sent, clinical questions answered. Awaiting determination    Turnaround time for your insurance to make a decision on your Prior Authorization can take 7-21 business days.

## 2024-06-18 NOTE — PATIENT INSTRUCTIONS
Diet for Stomach Ulcers and Gastritis   WHAT YOU NEED TO KNOW:   A diet for stomach ulcers and gastritis is a meal plan that limits foods that irritate your stomach. Certain foods may worsen symptoms such as stomach pain, bloating, heartburn, or indigestion.  DISCHARGE INSTRUCTIONS:   Foods to limit or avoid:  You may need to avoid acidic, spicy, or high-fat foods. Not all foods affect everyone the same way. You will need to learn which foods worsen your symptoms and limit those foods. The following are some foods that may worsen ulcer or gastritis symptoms:  Beverages:      Whole milk and chocolate milk    Hot cocoa and cola    Any beverage with caffeine    Regular and decaffeinated coffee    Peppermint and spearmint tea    Green and black tea, with or without caffeine    Orange and grapefruit juices    Drinks that contain alcohol    Spices and seasonings:      Black and red pepper    Chili powder    Mustard seed and nutmeg    Other foods:      Dairy foods made from whole milk or cream    Chocolate    Spicy or strongly flavored cheeses, such as jalapeno or black pepper    Highly seasoned, high-fat meats, such as sausage, salami, agrawal, ham, and cold cuts    Hot chiles and peppers    Tomato products, such as tomato paste, tomato sauce, or tomato juice    Foods to include:  Eat a variety of healthy foods from all the food groups. Eat fruits, vegetables, whole grains, and fat-free or low-fat dairy foods. Whole grains include whole-wheat breads, cereals, pasta, and brown rice. Choose lean meats, poultry (chicken and turkey), fish, beans, eggs, and nuts. A healthy meal plan is low in unhealthy fats, salt, and added sugar. Healthy fats include olive oil and canola oil. Ask your dietitian for more information about a healthy diet.       Other helpful guidelines:   Do not eat right before bedtime.  Stop eating at least 2 hours before bedtime.    Eat small, frequent meals.  Your stomach may tolerate small, frequent meals  better than large meals.    © Copyright [employer] 2023 Information is for End User's use only and may not be sold, redistributed or otherwise used for commercial purposes.  The above information is an  only. It is not intended as medical advice for individual conditions or treatments. Talk to your doctor, nurse or pharmacist before following any medical regimen to see if it is safe and effective for you.

## 2024-06-18 NOTE — PROGRESS NOTES
"[facility] - Outpatient Follow-up Note  Arleth Shah 49 y.o. female MRN: [id number]  Encounter: [id number]          ASSESSMENT AND PLAN:       Arleth is a 50 y/o female with hx of DVT on xarelto who presents for follow-up.      Personal hx of colon polyps  Colonoscopy 3/2024 noted 10+ polyps, 5 of which were  serrated adenoma  polyps and at least 2 of them being >1 cm in size. It is suspected that she has serrated polyposis syndrome.  -repeat colonoscopy scheduled for August 2. GERD with erosive esophagitis  3. Gastric intestinal metaplasia   4. PUD  EGD 3/2024 noted erosive Grade A esophagitis, diffuse erythema in the fundus, erosions and one clean-based ulcer in the antrum and duodenitis with gastric bx negative for H.pylori but did depict intestinal metaplasia without dysplasia. Pt is due for repeat EGD in the next 1-2 months. She says she is only taking omeprazole 20 mg once/day in the evening and pepcid QHS so she continues to wake up with \"burning\" in her stomach and gets bloating throughout the day.   -due to EGD findings + ongoing symptoms, please increase PPI to BID  -continue pepcid 40 mg QHS      ______________________________________________________________________    SUBJECTIVE:  She says she is only taking omeprazole 20 mg once/day in the evening and pepcid QHS so she continues to wake up with \"burning\" in her stomach and gets bloating throughout the day. She denies n/v, trouble swallowing, lower abdominal pain, constipation, diarrhea, fevers, chills, night sweats, NSAID use, bloody or black BM.       REVIEW OF SYSTEMS IS OTHERWISE NEGATIVE.      Historical Information   Past Medical History:   Diagnosis Date    Deep vein blood clot of left lower extremity (HCC) 2019     Past Surgical History:   Procedure Laterality Date    TUBAL LIGATION  1998    US BREAST CYST ASPIRATION RIGHT INITIAL Right 07/11/2019     Social History   Social History     Substance and Sexual " Activity   Alcohol Use Yes    Alcohol/week: 1.0 standard drink of alcohol    Types: 1 Cans of beer per week    Comment: occasional     Social History     Substance and Sexual Activity   Drug Use Never     Social History     Tobacco Use   Smoking Status Every Day    Current packs/day: 1.00    Average packs/day: 1 pack/day for 30.3 years (30.3 ttl pk-yrs)    Types: Cigarettes    Start date: 12/6/1999   Smokeless Tobacco Never     Family History   Problem Relation Age of Onset    Skin cancer Mother     Edema Father     Throat cancer Father     COPD Father     Cancer Father     No Known Problems Sister     No Known Problems Daughter     No Known Problems Maternal Grandmother     Stroke Maternal Grandfather     Alcohol abuse Maternal Grandfather     No Known Problems Paternal Grandmother     No Known Problems Paternal Grandfather     Colon cancer Maternal Uncle     No Known Problems Paternal Aunt     No Known Problems Paternal Aunt     No Known Problems Paternal Aunt     Breast cancer Neg Hx     Breast cancer additional onset Neg Hx     Ovarian cancer Neg Hx     Endometrial cancer Neg Hx     BRCA2 Positive Neg Hx     BRCA2 Negative Neg Hx     BRCA1 Positive Neg Hx     BRCA1 Negative Neg Hx     BRCA 1/2 Neg Hx        Meds/Allergies       Current Outpatient Medications:     famotidine (PEPCID) 40 MG tablet    furosemide (LASIX) 20 mg tablet    omeprazole (PriLOSEC) 20 mg delayed release capsule    rivaroxaban (Xarelto) 15 mg tablet    No Known Allergies        Objective     Last menstrual period 05/22/2022. There is no height or weight on file to calculate BMI.      PHYSICAL EXAM:      General Appearance:   Alert, cooperative, no distress   HEENT:   Normocephalic, atraumatic, anicteric.     Neck:  Supple, symmetrical, trachea midline   Lungs:   Clear to auscultation bilaterally; no rales, rhonchi or wheezing; respirations unlabored    Heart::   Regular rate and rhythm; no murmur, rub, or gallop.   Abdomen:   Soft,  non-tender, non-distended; normal bowel sounds; no masses, no organomegaly    Genitalia:   Deferred    Rectal:   Deferred    Extremities:  No cyanosis, clubbing or edema    Pulses:  2+ and symmetric    Skin:  No jaundice, rashes, or lesions    Lymph nodes:  No palpable cervical lymphadenopathy        Lab Results:   No visits with results within 1 Day(s) from this visit.   Latest known visit with results is:   Hospital Outpatient Visit on 03/26/2024   Component Date Value    Case Report 03/26/2024                      Value:Surgical Pathology Report                         Case: [id number]                                  Authorizing Provider:  Panfilo Saba DO  Collected:           03/26/2024 1141              Ordering Location:     [facility] [place] Received:            03/26/2024 1627                                     Endoscopy                                                                    Pathologist:           Gurpreet Armstrong MD                                                          Specimens:   A) - Stomach, ANTRUM BX, R/O H PYLORI                                                               B) - Esophagus, IRREGULAR ZLINE AT 40 CM R/O BARRETTS                                               C) - Stomach, ANTRUM ULCER BX                                                                       D) - Stomach, FUNDUS ERYTHEMA BX, R/O H PYLORI                                                      E) - Large Intestine, Cecum, POLYP                                                                                            F) - Large Intestine, Right/Ascending Colon, MID POLYP(S)X2                                         G) - Large Intestine, Hepatic Flexure, POLYP(S)X2                                                   H) - Large Intestine, Transverse Colon, MID POLYP                                                   I) - Large Intestine, Left/Descending Colon, PROX POLYP                                              J) - Colon, POLYP(S)X2 AT 30 CM                                                                     K) - Colon, POLYP(S)X5                                                                              L) - Colon, POLYP AT 15 CM                                                                 Final Diagnosis 03/26/2024                      Value:This result contains rich text formatting which cannot be displayed here.    Note 03/26/2024                      Value:This result contains rich text formatting which cannot be displayed here.    Additional Information 03/26/2024                      Value:This result contains rich text formatting which cannot be displayed here.    Synoptic Checklist 03/26/2024                      Value:                            COLON/RECTUM POLYP FORM - GI - L, J, H, G, F                                                                                     :    Adenoma(s)                                                         :    Serrated Adenoma      Gross Description 03/26/2024                      Value:This result contains rich text formatting which cannot be displayed here.         Radiology Results:   No results found.

## 2024-06-20 DIAGNOSIS — M79.89 SWELLING OF LOWER LEG: ICD-10-CM

## 2024-06-20 DIAGNOSIS — I82.402 DEEP VEIN THROMBOSIS (DVT) OF LEFT LOWER EXTREMITY, UNSPECIFIED CHRONICITY, UNSPECIFIED VEIN (HCC): ICD-10-CM

## 2024-06-20 RX ORDER — RIVAROXABAN 15 MG/1
15 TABLET, FILM COATED ORAL
Qty: 30 TABLET | Refills: 0 | Status: SHIPPED | OUTPATIENT
Start: 2024-06-20

## 2024-06-20 RX ORDER — FUROSEMIDE 20 MG/1
20 TABLET ORAL DAILY
Qty: 30 TABLET | Refills: 0 | Status: SHIPPED | OUTPATIENT
Start: 2024-06-20

## 2024-06-20 NOTE — TELEPHONE ENCOUNTER
Approved    Prior authorization approved  Payer: [facility] Case ID: [id number]    [phone]  Note from payer: The case has been Approved from  [id number] to [id number]  Approval Details    Authorized from May 19, 2024 to June 18, 2025  Electronic appeal: Not supported  View History  Medication Being Authorized    omeprazole (PriLOSEC) 20 mg delayed release capsule  Take 1 capsule (20 mg total) by mouth 2 (two) times a day 30 minutes before breakfast and 30 minutes before dinner  Dispense: 180 capsule Refills: 1   Start: 6/18/2024   Class: Normal Diagnoses: Gastric ulcer without hemorrhage or perforation, unspecified chronicity; Erosive esophagitis   This order has been released to its destination.    PA for omeprazole BID Approved     Date(s) approved until 6/18/2025    Case ID: [id number]   Patient advised by          [x] Busy Moos Message  [] Phone call   [x]LMOM  []L/M to call office as no active Communication consent on file  []Unable to leave detailed message as VM not approved on Communication consent       Pharmacy advised by    [x]Fax  []Phone call    Approval letter NOT scanned into Media No letter rcvd at time of approval

## 2024-06-28 ENCOUNTER — TRANSCRIBE ORDERS (OUTPATIENT)
Dept: GASTROENTEROLOGY | Facility: CLINIC | Age: 49
End: 2024-06-28

## 2024-08-04 DIAGNOSIS — I82.402 DEEP VEIN THROMBOSIS (DVT) OF LEFT LOWER EXTREMITY, UNSPECIFIED CHRONICITY, UNSPECIFIED VEIN (HCC): ICD-10-CM

## 2024-08-05 ENCOUNTER — PREP FOR PROCEDURE (OUTPATIENT)
Dept: GASTROENTEROLOGY | Facility: CLINIC | Age: 49
End: 2024-08-05

## 2024-08-05 ENCOUNTER — TELEPHONE (OUTPATIENT)
Age: 49
End: 2024-08-05

## 2024-08-05 DIAGNOSIS — R19.5 POSITIVE COLORECTAL CANCER SCREENING USING COLOGUARD TEST: Primary | ICD-10-CM

## 2024-08-05 NOTE — TELEPHONE ENCOUNTER
Patients GI provider:  JOHANNA Fuentes     Number to return call: [phone]    Reason for call: Pt calling because she has an proc scheduled for 08.07 but says she was never told what her hold would be for the Xaralto and that her prep hadn't been sent to the pharm yet. I told her its was listed for a 2 day hold but that there would be no prep for this as it is just for an EGD. She says she is supposed to be having both, that Vivienne told her she would be doing them together again. She would like someone to look into this and give her a call back please.    Scheduled procedure/appointment date if applicable: 08.07.24

## 2024-08-05 NOTE — TELEPHONE ENCOUNTER
Yes, colonoscopy should be done at the same time as her EGD. Please add this on and let the pt know/provide her with instructions: GOLYTELY prep. Thanks!

## 2024-08-06 ENCOUNTER — TELEPHONE (OUTPATIENT)
Dept: GASTROENTEROLOGY | Facility: CLINIC | Age: 49
End: 2024-08-06

## 2024-08-06 RX ORDER — SODIUM CHLORIDE, SODIUM LACTATE, POTASSIUM CHLORIDE, CALCIUM CHLORIDE 600; 310; 30; 20 MG/100ML; MG/100ML; MG/100ML; MG/100ML
125 INJECTION, SOLUTION INTRAVENOUS CONTINUOUS
Status: CANCELLED | OUTPATIENT
Start: 2024-08-06

## 2024-08-06 NOTE — TELEPHONE ENCOUNTER
Hi, I saw a message in the chart the patient did not receive instructions regarding her Xarelto hold.  Please see that her last dose of Xarelto was on Sunday and held on Monday and Tuesday for procedures on Wednesday.  Thank you

## 2024-08-07 ENCOUNTER — HOSPITAL ENCOUNTER (OUTPATIENT)
Dept: GASTROENTEROLOGY | Facility: HOSPITAL | Age: 49
Setting detail: OUTPATIENT SURGERY
Discharge: HOME | End: 2024-08-07
Attending: INTERNAL MEDICINE
Payer: COMMERCIAL

## 2024-08-07 ENCOUNTER — TELEPHONE (OUTPATIENT)
Dept: GASTROENTEROLOGY | Facility: CLINIC | Age: 49
End: 2024-08-07

## 2024-08-07 ENCOUNTER — ANESTHESIA (OUTPATIENT)
Dept: GASTROENTEROLOGY | Facility: HOSPITAL | Age: 49
End: 2024-08-07

## 2024-08-07 ENCOUNTER — ANESTHESIA EVENT (OUTPATIENT)
Dept: GASTROENTEROLOGY | Facility: HOSPITAL | Age: 49
End: 2024-08-07

## 2024-08-07 VITALS
TEMPERATURE: 97.2 F | OXYGEN SATURATION: 100 % | RESPIRATION RATE: 18 BRPM | SYSTOLIC BLOOD PRESSURE: 116 MMHG | DIASTOLIC BLOOD PRESSURE: 55 MMHG | HEART RATE: 64 BPM

## 2024-08-07 DIAGNOSIS — R19.5 POSITIVE COLORECTAL CANCER SCREENING USING COLOGUARD TEST: ICD-10-CM

## 2024-08-07 DIAGNOSIS — R14.0 ABDOMINAL BLOATING: ICD-10-CM

## 2024-08-07 PROBLEM — Z72.89 ENGAGES IN VAPING: Status: ACTIVE | Noted: 2024-08-07

## 2024-08-07 PROCEDURE — 88342 IMHCHEM/IMCYTCHM 1ST ANTB: CPT | Performed by: STUDENT IN AN ORGANIZED HEALTH CARE EDUCATION/TRAINING PROGRAM

## 2024-08-07 PROCEDURE — 43239 EGD BIOPSY SINGLE/MULTIPLE: CPT | Performed by: INTERNAL MEDICINE

## 2024-08-07 PROCEDURE — 45385 COLONOSCOPY W/LESION REMOVAL: CPT | Performed by: INTERNAL MEDICINE

## 2024-08-07 PROCEDURE — 88305 TISSUE EXAM BY PATHOLOGIST: CPT | Performed by: STUDENT IN AN ORGANIZED HEALTH CARE EDUCATION/TRAINING PROGRAM

## 2024-08-07 RX ORDER — SODIUM CHLORIDE, SODIUM LACTATE, POTASSIUM CHLORIDE, CALCIUM CHLORIDE 600; 310; 30; 20 MG/100ML; MG/100ML; MG/100ML; MG/100ML
125 INJECTION, SOLUTION INTRAVENOUS CONTINUOUS
Status: ACTIVE | OUTPATIENT
Start: 2024-08-07 | End: 2024-08-07

## 2024-08-07 RX ORDER — GLYCOPYRROLATE 0.2 MG/ML
INJECTION INTRAMUSCULAR; INTRAVENOUS AS NEEDED
Status: DISCONTINUED | OUTPATIENT
Start: 2024-08-07 | End: 2024-08-07

## 2024-08-07 RX ORDER — PROPOFOL 10 MG/ML
INJECTION, EMULSION INTRAVENOUS AS NEEDED
Status: DISCONTINUED | OUTPATIENT
Start: 2024-08-07 | End: 2024-08-07

## 2024-08-07 RX ORDER — LIDOCAINE HYDROCHLORIDE 20 MG/ML
INJECTION, SOLUTION EPIDURAL; INFILTRATION; INTRACAUDAL; PERINEURAL AS NEEDED
Status: DISCONTINUED | OUTPATIENT
Start: 2024-08-07 | End: 2024-08-07

## 2024-08-07 RX ORDER — SODIUM CHLORIDE, SODIUM LACTATE, POTASSIUM CHLORIDE, CALCIUM CHLORIDE 600; 310; 30; 20 MG/100ML; MG/100ML; MG/100ML; MG/100ML
125 INJECTION, SOLUTION INTRAVENOUS CONTINUOUS
Status: DISCONTINUED | OUTPATIENT
Start: 2024-08-07 | End: 2024-08-07

## 2024-08-07 RX ADMIN — LIDOCAINE HYDROCHLORIDE 25 MG: 20 INJECTION, SOLUTION EPIDURAL; INFILTRATION; INTRACAUDAL; PERINEURAL at 10:00

## 2024-08-07 RX ADMIN — GLYCOPYRROLATE 0.2 MG: 0.2 INJECTION, SOLUTION INTRAMUSCULAR; INTRAVENOUS at 09:56

## 2024-08-07 RX ADMIN — PROPOFOL 140 MCG/KG/MIN: 10 INJECTION, EMULSION INTRAVENOUS at 10:01

## 2024-08-07 RX ADMIN — PROPOFOL 130 MG: 10 INJECTION, EMULSION INTRAVENOUS at 10:00

## 2024-08-07 RX ADMIN — BENZOCAINE 1 APPLICATION: 220 GEL, DENTIFRICE DENTAL at 10:02

## 2024-08-07 RX ADMIN — SODIUM CHLORIDE, SODIUM LACTATE, POTASSIUM CHLORIDE, AND CALCIUM CHLORIDE 125 ML/HR: .6; .31; .03; .02 INJECTION, SOLUTION INTRAVENOUS at 08:57

## 2024-08-07 RX ADMIN — PROPOFOL 30 MG: 10 INJECTION, EMULSION INTRAVENOUS at 10:08

## 2024-08-07 NOTE — DISCHARGE INSTRUCTIONS
..Upper Endoscopy and Colonoscopy   WHAT YOU NEED TO KNOW:   An upper endoscopy is also called an upper gastrointestinal (GI) endoscopy, or an esophagogastroduodenoscopy (EGD). It is a procedure to examine the inside of your esophagus, stomach, and duodenum (first part of the small intestine) with a scope. You may feel bloated, gassy, or have some abdominal discomfort after your procedure. Your throat may be sore for 24 to 36 hours. You may burp or pass gas from air that is still inside your body.                A colonoscopy is a procedure to examine the inside of your colon (intestine) with a scope. Polyps or tissue growths may have been removed during your colonoscopy. It is normal to feel bloated and to have some abdominal discomfort. You should be passing gas. If you have hemorrhoids or you had polyps removed, you may have a small amount of bleeding.          DISCHARGE INSTRUCTIONS:   Seek care immediately if:   You have sudden, severe abdominal pain.     You have problems swallowing.     You have a large amount of black, sticky bowel movements or blood in your bowel movements.     You have sudden trouble breathing.     You feel weak, lightheaded, or faint or your heart beats faster than normal for you.     Contact your healthcare provider if:   You have a fever and chills.      You have nausea or are vomiting.      Your abdomen is bloated or feels full and hard.     You have abdominal pain.    You have a large amount of black, sticky bowel movements or blood in your bowel movements.    You have not had a bowel movement for 3 days after your procedure.    You have rash or hives.    You have questions or concerns about your procedure.     Activity:   ·       Do not lift, strain, or run for 24 hours after your procedure.     ·       Rest after your procedure. You have been given medicine to relax you. Do not drive or make important decisions until the day after your procedure. Return to your normal activity as  directed.     ·       Relieve gas and discomfort from bloating by lying on your right side with a heating pad on your abdomen. You may need to take short walks to help the gas move out. Eat small meals until bloating is relieved.  Follow up with your healthcare provider as directed: Write down your questions so you remember to ask them during your visits.      If you take a “blood thinner”, please review the specific instructions from your endoscopist about when you should resume it. These can be found in the “Recommendation” and “Your Medication list” sections of this After Visit Summary.     Propranolol Counseling:  I discussed with the patient the risks of propranolol including but not limited to low heart rate, low blood pressure, low blood sugar, restlessness and increased cold sensitivity. They should call the office if they experience any of these side effects.

## 2024-08-07 NOTE — H&P
History and Physical - [facility]  Arleth Shah 49 y.o. female MRN: [id number]                  HPI: Arleth Shah is a 49 y.o. year old female who presents for EGD and colonoscopy.  She was last seen by Vivienne Fuentes on June 18, 2024.  She presents for surveillance EGD and colonoscopy.  At time of prior EGD she was noted to have mild duodenitis.  Antral erythema biopsies were negative for H. pylori.  There are few antral erosions.  There is a 6 mm polyp in the lesser curvature in the antrum.  Biopsy revealed some intestinal metaplasia.  Biopsy irregular Z-line at 40 cm was negative for Simons's esophagus.  She did have LA grade a erosive esophagitis.    Her colonoscopy revealed multiple colon polyps.  There were 2 polyps in the ascending colon that measured 12 to 15 mm these were serrated adenomas.  There are 2 polyps in hepatic flexure measuring 6 mm that were serrated adenoma and a 7 mm polyp in the transverse colon that was a serrated adenoma.  There was a 1 cm pedunculated polyp in the sigmoid colon at 15 cm this was a tubulovillous adenoma.  A single clip was placed in this region.  She had several polyps in the distal 15 cm of the colon that all appeared hyperplastic.  She did have moderate diverticulosis.  She presents today for surveillance colonoscopy.      Patient reports having breakthrough heartburn especially in the morning.  She does not really get much heartburn during the night.  But she will feel as if she may be regurgitating at night.  There is been no dysphagia nausea vomiting or early satiety.  She has been using omeprazole 20 mg twice a day and famotidine 40 mg 2 hours before bed.    Bowel habits been very regular, denies any diarrhea, constipation, bleeding or black stools.    Maternal uncle had colorectal cancer but there were no other relatives with colon cancer.    She does vape tobacco.  She does not drink alcohol    REVIEW OF SYSTEMS: Per the HPI, and otherwise  unremarkable.    Historical Information   Past Medical History:   Diagnosis Date    Colon polyp     Deep vein blood clot of left lower extremity (HCC) 2019     Past Surgical History:   Procedure Laterality Date    TUBAL LIGATION  1998    US BREAST CYST ASPIRATION RIGHT INITIAL Right 07/11/2019     Social History   Social History     Substance and Sexual Activity   Alcohol Use Yes    Alcohol/week: 1.0 standard drink of alcohol    Types: 1 Cans of beer per week    Comment: occasional     Social History     Substance and Sexual Activity   Drug Use Never     Social History     Tobacco Use   Smoking Status Some Days    Current packs/day: 1.00    Average packs/day: 1 pack/day for 30.5 years (30.5 ttl pk-yrs)    Types: Cigarettes    Start date: 12/6/1999   Smokeless Tobacco Never     Family History   Problem Relation Age of Onset    Skin cancer Mother     Edema Father     Throat cancer Father     COPD Father     Cancer Father     No Known Problems Sister     No Known Problems Daughter     No Known Problems Maternal Grandmother     Stroke Maternal Grandfather     Alcohol abuse Maternal Grandfather     No Known Problems Paternal Grandmother     No Known Problems Paternal Grandfather     Colon cancer Maternal Uncle     No Known Problems Paternal Aunt     No Known Problems Paternal Aunt     No Known Problems Paternal Aunt     Breast cancer Neg Hx     Breast cancer additional onset Neg Hx     Ovarian cancer Neg Hx     Endometrial cancer Neg Hx     BRCA2 Positive Neg Hx     BRCA2 Negative Neg Hx     BRCA1 Positive Neg Hx     BRCA1 Negative Neg Hx     BRCA 1/2 Neg Hx        Meds/Allergies       Current Outpatient Medications:     simethicone (Gas-X Ultra Strength) 180 MG capsule    famotidine (PEPCID) 40 MG tablet    furosemide (LASIX) 20 mg tablet    omeprazole (PriLOSEC) 20 mg delayed release capsule    polyethylene glycol (GOLYTELY) 4000 mL solution    rivaroxaban (Xarelto) 15 mg tablet    Current Facility-Administered  Medications:     lactated ringers infusion, 125 mL/hr, Intravenous, Continuous, 125 mL/hr at 08/07/24 0857    No Known Allergies    Objective     /62   Pulse (!) 51   Temp 97.6 °F (36.4 °C) (Temporal)   Resp 18   LMP 05/22/2022 (Approximate)   SpO2 100%       PHYSICAL EXAM    Gen: NAD  Head: NCAT  CV: RRR  CHEST: Clear  ABD: soft, NT/ND  EXT: no edema      ASSESSMENT/PLAN:  This is a 49 y.o. year old female here for colonoscopy and EGD, and she is stable and optimized for her procedure.

## 2024-08-07 NOTE — ANESTHESIA POSTPROCEDURE EVALUATION
Post-Op Assessment Note    CV Status:  Stable  Pain Score: 0    Pain management: adequate       Mental Status:  Arousable   Hydration Status:  Stable   PONV Controlled:  None   Airway Patency:  Patent     Post Op Vitals Reviewed: Yes    No anethesia notable event occurred.    Staff: CRNA               BP  90/65   Temp      Pulse  63   Resp   14   SpO2   99%

## 2024-08-07 NOTE — ANESTHESIA PREPROCEDURE EVALUATION
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      Anesthesia Plan  ASA Score- 2     Anesthesia Type- IV sedation with anesthesia with ASA Monitors.         Additional Monitors:     Airway Plan:     Comment: I discussed the risks and benefits of IV sedation anesthesia including the possibility of the need to convert to general anesthesia and the potential risk of awareness.  The patient was given the opportunity to ask questions, which were answered..       Plan Factors-Exercise tolerance (METS): >4 METS.    Chart reviewed.        Patient is a current smoker.  Patient smoked on day of surgery.            Induction- intravenous.    Postoperative Plan-         Informed Consent- Anesthetic plan and risks discussed with patient.  I personally reviewed this patient with the CRNA. Discussed and agreed on the Anesthesia Plan with the CRNA..

## 2024-08-07 NOTE — TELEPHONE ENCOUNTER
Luis M Palafox, I just wanted to let you know I did an EGD and colonoscopy this patient today.  She had several flat hyperplastic/serrated appearing polyps.  Lets see what the biopsies show, she probably meets a serrated polyposis syndrome diagnosis.  EGD revealed healed esophagitis.  I did do some gastric mapping.  I biopsied the distal esophagus.  Still complaining of morning symptoms despite famotidine 40 mg in the evening and omeprazole 20 mg twice a day..  I suggested maybe trying Gaviscon at bedtime or we could also try adding Carafate.  Looks like she does not have a follow-up with you until December.  Take a look at my procedure reports.  Hope you are doing well

## 2024-08-15 PROCEDURE — 88305 TISSUE EXAM BY PATHOLOGIST: CPT | Performed by: STUDENT IN AN ORGANIZED HEALTH CARE EDUCATION/TRAINING PROGRAM

## 2024-08-15 PROCEDURE — 88342 IMHCHEM/IMCYTCHM 1ST ANTB: CPT | Performed by: STUDENT IN AN ORGANIZED HEALTH CARE EDUCATION/TRAINING PROGRAM

## 2024-08-15 NOTE — RESULT ENCOUNTER NOTE
Please inform patient that biopsies from the stomach are benign and negative for bacteria called H. pylori.  Biopsy of the distal esophagus are benign and negative for Simons's esophagus.  Multiple biopsies obtained throughout the stomach did not reveal any evidence of intestinal metaplasia as previously suggested.  Gastric polyps are benign hyperplastic polyps.  Based upon this finding I would recommend repeating upper endoscopy in 3 years.  Please recall for EGD in 3 years.    Colon polyps are all benign few of the polyps are precancerous and were completely removed.  Based upon these findings I would recommend repeating colonoscopy in 2 years.  Please recall for repeat colonoscopy in 2 years.  Follow-up with Vivienne Fuentes as of scheduled I will copy Jayant as an FYI.

## 2024-10-03 DIAGNOSIS — M79.89 SWELLING OF LOWER LEG: ICD-10-CM

## 2024-10-04 RX ORDER — FUROSEMIDE 20 MG
20 TABLET ORAL DAILY
Qty: 90 TABLET | Refills: 1 | Status: SHIPPED | OUTPATIENT
Start: 2024-10-04

## 2024-12-06 ENCOUNTER — OFFICE VISIT (OUTPATIENT)
Dept: GASTROENTEROLOGY | Facility: CLINIC | Age: 49
End: 2024-12-06
Payer: COMMERCIAL

## 2024-12-06 VITALS
BODY MASS INDEX: 34.49 KG/M2 | TEMPERATURE: 98.9 F | DIASTOLIC BLOOD PRESSURE: 68 MMHG | WEIGHT: 202 LBS | SYSTOLIC BLOOD PRESSURE: 112 MMHG | HEIGHT: 64 IN

## 2024-12-06 DIAGNOSIS — R14.0 BLOATING: ICD-10-CM

## 2024-12-06 DIAGNOSIS — K21.9 GASTROESOPHAGEAL REFLUX DISEASE WITHOUT ESOPHAGITIS: Primary | ICD-10-CM

## 2024-12-06 DIAGNOSIS — Z86.0100 PERSONAL HISTORY OF COLON POLYPS, UNSPECIFIED: ICD-10-CM

## 2024-12-06 PROCEDURE — 99214 OFFICE O/P EST MOD 30 MIN: CPT | Performed by: PHYSICIAN ASSISTANT

## 2024-12-06 NOTE — PATIENT INSTRUCTIONS
Over-the-counter BEANO As needed for the bloating: you can also take the beano with meals   Food journal     6M F/U scheduled on 6/9/25 in [facility].

## 2024-12-06 NOTE — PROGRESS NOTES
"Name: Arleth Shah      : 1975      MRN: [id number]  Encounter Provider: Vivienne Fuentes PA-C  Encounter Date: 2024   Encounter department: [facility] [place]  :  Assessment & Plan  Gastroesophageal reflux disease without esophagitis  Repeat EGD noted healed esophagitis and healed ulcer disease; she did not have any evidence of gastric intestinal metaplasia, Simons's esophagus, H. pylori on pathology.  She did have gastric polyps that were benign on pathology.  Due to the gastric intestinal metaplasia that was noted on her prior EGD, it was recommended that she repeats an EGD in about 3 years.  She says she was doing \" much better\" on omeprazole twice daily and Pepcid nightly.  She says she would like to stay on this regimen for now as she is feeling well.  -Continue omeprazole 20 mg twice daily and Pepcid 40 mg nightly however if she remains stable at her next office visit, I would like to try to decrease this regimen at that time       Personal history of colon polyps, unspecified  Colonoscopy noted several, >15, polyps.  It appears only 2 of the polyps were sessile serrated adenomas on pathology whereas the rest were hyperplastic.  For this reason, it was recommended that she repeat colonoscopy in 2 years 2026       Bloating  Patient says that she is feeling better and is able to eat without issues for the most part however she continues to get abdominal bloating after eating certain meals.  She says there are times when this is is brief and not very bothersome but then there are times when the bloating can be severe.  She says this is not occurring every day but does occur  once or twice every week or 2.  She says Gas-X did not help.  -Start a food journal: I explained that there are certain things that can certainly cause excessive bloating, like dairy, meals that are very high in fiber, fatty greasy meals; I asked her to start a journal so that we can try to " "figure out if she has any specific food triggers  -Start over-the-counter Beano as needed: I explained she can even take this with her meals if she would like  If she does not find any-certain triggers with the food journal and this worsens, we could discuss SIBO testing or gastric emptying study depending on her her symptoms at that time           History of Present Illness   Abdominal Pain  This is a chronic problem. The current episode started more than 1 year ago. The onset quality is undetermined. The problem occurs daily. The most recent episode lasted 4 hours. The problem has been gradually improving. The pain is located in the generalized abdominal region. The pain is at a severity of 5/10. The quality of the pain is cramping and a sensation of fullness. The abdominal pain does not radiate. Associated symptoms include arthralgias, belching and flatus. Pertinent negatives include no anorexia, constipation, diarrhea, dysuria, fever, frequency, headaches, hematochezia, hematuria, melena, myalgias, nausea, vomiting or weight loss. The pain is aggravated by eating. The pain is relieved by Activity, belching, certain positions and passing flatus. Prior diagnostic workup includes lower endoscopy and upper endoscopy.     Arleth Shah is a 49 y.o. female who presents for follow-up. She says she was doing \" much better\" on omeprazole twice daily and Pepcid nightly.  She says she would like to stay on this regimen for now as she is feeling well.Patient says that she is feeling better and is able to eat without issues for the most part however she continues to get abdominal bloating after eating certain meals.  She says there are times when this is is brief and not very bothersome but then there are times when the bloating can be severe.  She says this is not occurring every day but does occur  once or twice every week or 2.  She says Gas-X did not help. She says she moves her bowels well without constipation or " straining. She denies diarrhea, n/v, trouble swallowing, abdominal pain, bloody or black BM.   History obtained from: patient    Review of Systems   Constitutional:  Negative for fever and weight loss.   Gastrointestinal:  Positive for abdominal distention and flatus. Negative for abdominal pain, anal bleeding, anorexia, blood in stool, constipation, diarrhea, hematochezia, melena, nausea, rectal pain and vomiting.   Genitourinary:  Negative for dysuria, frequency and hematuria.   Musculoskeletal:  Positive for arthralgias. Negative for myalgias.   Neurological:  Negative for headaches.     Medical History Reviewed by provider this encounter:     .  Past Medical History   Past Medical History:   Diagnosis Date    Colon polyp     Deep vein blood clot of left lower extremity (HCC) 2019     Past Surgical History:   Procedure Laterality Date    TUBAL LIGATION  1998    US BREAST CYST ASPIRATION RIGHT INITIAL Right 07/11/2019     Family History   Problem Relation Age of Onset    Skin cancer Mother     Edema Father     Throat cancer Father     COPD Father     Cancer Father     No Known Problems Sister     No Known Problems Daughter     No Known Problems Maternal Grandmother     Stroke Maternal Grandfather     Alcohol abuse Maternal Grandfather     No Known Problems Paternal Grandmother     No Known Problems Paternal Grandfather     Colon cancer Maternal Uncle     No Known Problems Paternal Aunt     No Known Problems Paternal Aunt     No Known Problems Paternal Aunt     Breast cancer Neg Hx     Breast cancer additional onset Neg Hx     Ovarian cancer Neg Hx     Endometrial cancer Neg Hx     BRCA2 Positive Neg Hx     BRCA2 Negative Neg Hx     BRCA1 Positive Neg Hx     BRCA1 Negative Neg Hx     BRCA 1/2 Neg Hx       reports that she has been smoking cigarettes. She started smoking about 25 years ago. She has a 30.8 pack-year smoking history. She has never used smokeless tobacco. She reports current alcohol use of about 1.0  standard drink of alcohol per week. She reports that she does not use drugs.  Current Outpatient Medications on File Prior to Visit   Medication Sig Dispense Refill    famotidine (PEPCID) 40 MG tablet Take 1 tablet (40 mg total) by mouth daily at bedtime Take in evening 2 hours prior to bed 90 tablet 3    furosemide (LASIX) 20 mg tablet TAKE 1 TABLET BY MOUTH EVERY DAY 90 tablet 1    omeprazole (PriLOSEC) 20 mg delayed release capsule Take 1 capsule (20 mg total) by mouth 2 (two) times a day 30 minutes before breakfast and 30 minutes before dinner 180 capsule 1    rivaroxaban (Xarelto) 15 mg tablet Take 1 tablet (15 mg total) by mouth daily with breakfast 90 tablet 1    simethicone (Gas-X Ultra Strength) 180 MG capsule Take 1 capsule (180 mg total) by mouth every 6 (six) hours as needed (gas and bloating) 60 capsule 2     No current facility-administered medications on file prior to visit.   No Known Allergies   Current Outpatient Medications on File Prior to Visit   Medication Sig Dispense Refill    famotidine (PEPCID) 40 MG tablet Take 1 tablet (40 mg total) by mouth daily at bedtime Take in evening 2 hours prior to bed 90 tablet 3    furosemide (LASIX) 20 mg tablet TAKE 1 TABLET BY MOUTH EVERY DAY 90 tablet 1    omeprazole (PriLOSEC) 20 mg delayed release capsule Take 1 capsule (20 mg total) by mouth 2 (two) times a day 30 minutes before breakfast and 30 minutes before dinner 180 capsule 1    rivaroxaban (Xarelto) 15 mg tablet Take 1 tablet (15 mg total) by mouth daily with breakfast 90 tablet 1    simethicone (Gas-X Ultra Strength) 180 MG capsule Take 1 capsule (180 mg total) by mouth every 6 (six) hours as needed (gas and bloating) 60 capsule 2     No current facility-administered medications on file prior to visit.      Social History     Tobacco Use    Smoking status: Some Days     Current packs/day: 1.00     Average packs/day: 1 pack/day for 30.8 years (30.8 ttl pk-yrs)     Types: Cigarettes     Start date:  12/6/1999    Smokeless tobacco: Never   Vaping Use    Vaping status: Every Day    Substances: Nicotine   Substance and Sexual Activity    Alcohol use: Yes     Alcohol/week: 1.0 standard drink of alcohol     Types: 1 Cans of beer per week     Comment: occasional    Drug use: Never    Sexual activity: Yes     Partners: Male     Birth control/protection: Female Sterilization        Objective   LMP 05/22/2022 (Approximate)      Physical Exam  Constitutional:       Appearance: Normal appearance.   Cardiovascular:      Rate and Rhythm: Normal rate and regular rhythm.   Pulmonary:      Breath sounds: Normal breath sounds.   Abdominal:      General: Bowel sounds are normal. There is no distension.      Palpations: There is no mass.      Tenderness: There is no abdominal tenderness. There is no guarding or rebound.   Neurological:      Mental Status: She is alert.         Administrative Statements   I have spent a total time of 30 minutes in caring for this patient on the day of the visit/encounter including Diagnostic results, Prognosis, Risks and benefits of tx options, Instructions for management, Patient and family education, Importance of tx compliance, Risk factor reductions, Impressions, Counseling / Coordination of care, Documenting in the medical record, Reviewing / ordering tests, medicine, procedures  , Obtaining or reviewing history  , and Communicating with other healthcare professionals .

## 2024-12-23 ENCOUNTER — OFFICE VISIT (OUTPATIENT)
Dept: FAMILY MEDICINE CLINIC | Facility: CLINIC | Age: 49
End: 2024-12-23
Payer: COMMERCIAL

## 2024-12-23 ENCOUNTER — APPOINTMENT (OUTPATIENT)
Dept: RADIOLOGY | Facility: CLINIC | Age: 49
End: 2024-12-23
Payer: COMMERCIAL

## 2024-12-23 VITALS
OXYGEN SATURATION: 97 % | DIASTOLIC BLOOD PRESSURE: 78 MMHG | WEIGHT: 205.38 LBS | SYSTOLIC BLOOD PRESSURE: 116 MMHG | HEART RATE: 77 BPM | BODY MASS INDEX: 35.06 KG/M2 | HEIGHT: 64 IN

## 2024-12-23 DIAGNOSIS — M79.671 PAIN OF RIGHT HEEL: ICD-10-CM

## 2024-12-23 DIAGNOSIS — R63.5 WEIGHT GAIN: ICD-10-CM

## 2024-12-23 DIAGNOSIS — Z13.1 SCREENING FOR DIABETES MELLITUS: ICD-10-CM

## 2024-12-23 DIAGNOSIS — Z72.89 ENGAGES IN VAPING: ICD-10-CM

## 2024-12-23 DIAGNOSIS — Z13.220 SCREENING FOR CHOLESTEROL LEVEL: ICD-10-CM

## 2024-12-23 DIAGNOSIS — Z00.00 ANNUAL PHYSICAL EXAM: Primary | ICD-10-CM

## 2024-12-23 PROCEDURE — 73630 X-RAY EXAM OF FOOT: CPT

## 2024-12-23 PROCEDURE — 99396 PREV VISIT EST AGE 40-64: CPT | Performed by: NURSE PRACTITIONER

## 2024-12-23 NOTE — PROGRESS NOTES
Adult Annual Physical  Name: Arleth Shah      : 1975      MRN: [id number]  Encounter Provider: GIOVANI Alexandra  Encounter Date: 2024   [facility]    Assessment & Plan  Annual physical exam         Pain of right heel  Heel spur vs achilles tendonitis vs other  Orders:  •  XR foot 3+ vw right; Future    Engages in vaping  Used to smoke cigarettes but has switched and is trying to taper down.         BMI 35.0-35.9,adult  Recent weight gain - will check thyroid.   Orders:  •  TSH, 3rd generation; Future  •  T4; Future    Weight gain    Orders:  •  TSH, 3rd generation; Future  •  T4; Future    Screening for cholesterol level    Orders:  •  Lipid panel; Future    Screening for diabetes mellitus    Orders:  •  Comprehensive metabolic panel; Future  •  Hemoglobin A1C; Future      Immunizations and preventive care screenings were discussed with patient today. Appropriate education was printed on patient's after visit summary.    Counseling:  Exercise: the importance of regular exercise/physical activity was discussed. Recommend exercise 3-5 times per week for at least 30 minutes.          History of Present Illness     Adult Annual Physical:  Patient presents for annual physical. Reports more recent hx of right heel pain - no obvious injury known.   Also notes of weight gain which has occurred over the past year.  She has not done any specific dieting but reports she has not changed her lifestyle which she was always less heavy with than now.    She continues with her blood thinner for DVT prevention.  .     Diet and Physical Activity:  - Diet/Nutrition: well balanced diet.  - Exercise: no formal exercise.    Depression Screening:  - PHQ-2 Score: 0    General Health:  - Sleep: sleeps well.  - Hearing: normal hearing right ear and normal hearing left ear.  - Vision: no vision problems.  - Dental: brushes teeth twice daily.    /GYN Health:  - Follows with  "GYN: yes.   - History of STDs: no  - Contraception: menopause.      Review of Systems   Constitutional:  Positive for unexpected weight change.   Respiratory: Negative.     Cardiovascular: Negative.    All other systems reviewed and are negative.    Current Outpatient Medications on File Prior to Visit   Medication Sig Dispense Refill   • Alpha-D-Galactosidase (BEANO PO) Take by mouth     • famotidine (PEPCID) 40 MG tablet Take 1 tablet (40 mg total) by mouth daily at bedtime Take in evening 2 hours prior to bed 90 tablet 3   • furosemide (LASIX) 20 mg tablet TAKE 1 TABLET BY MOUTH EVERY DAY 90 tablet 1   • omeprazole (PriLOSEC) 20 mg delayed release capsule Take 1 capsule (20 mg total) by mouth 2 (two) times a day 30 minutes before breakfast and 30 minutes before dinner 180 capsule 1   • rivaroxaban (Xarelto) 15 mg tablet Take 1 tablet (15 mg total) by mouth daily with breakfast 90 tablet 1     No current facility-administered medications on file prior to visit.        Objective   /78 (BP Location: Right arm, Patient Position: Sitting, Cuff Size: Standard)   Pulse 77   Ht 5' 4\" (1.626 m)   Wt 93.2 kg (205 lb 6 oz)   LMP 05/22/2022 (Approximate)   SpO2 97%   BMI 35.25 kg/m²     Physical Exam  Cardiovascular:      Rate and Rhythm: Normal rate and regular rhythm.      Heart sounds: Normal heart sounds.   Pulmonary:      Effort: Pulmonary effort is normal.      Breath sounds: Normal breath sounds.   Neurological:      Mental Status: She is alert and oriented to person, place, and time.         "

## 2024-12-23 NOTE — PATIENT INSTRUCTIONS
"Patient Education     Routine physical for adults   The Basics   Written by the doctors and editors at [facility]   What is a physical? -- A physical is a routine visit, or \"check-up,\" with your doctor. You might also hear it called a \"wellness visit\" or \"preventive visit.\"  During each visit, the doctor will:   Ask about your physical and mental health   Ask about your habits, behaviors, and lifestyle   Do an exam   Give you vaccines if needed   Talk to you about any medicines you take   Give advice about your health   Answer your questions  Getting regular check-ups is an important part of taking care of your health. It can help your doctor find and treat any problems you have. But it's also important for preventing health problems.  A routine physical is different from a \"sick visit.\" A sick visit is when you see a doctor because of a health concern or problem. Since physicals are scheduled ahead of time, you can think about what you want to ask the doctor.  How often should I get a physical? -- It depends on your age and health. In general, for people age 21 years and older:   If you are younger than 50 years, you might be able to get a physical every 3 years.   If you are 50 years or older, your doctor might recommend a physical every year.  If you have an ongoing health condition, like diabetes or high blood pressure, your doctor will probably want to see you more often.  What happens during a physical? -- In general, each visit will include:   Physical exam - The doctor or nurse will check your height, weight, heart rate, and blood pressure. They will also look at your eyes and ears. They will ask about how you are feeling and whether you have any symptoms that bother you.   Medicines - It's a good idea to bring a list of all the medicines you take to each doctor visit. Your doctor will talk to you about your medicines and answer any questions. Tell them if you are having any side effects that bother you. You " "should also tell them if you are having trouble paying for any of your medicines.   Habits and behaviors - This includes:   Your diet   Your exercise habits   Whether you smoke, drink alcohol, or use drugs   Whether you are sexually active   Whether you feel safe at home  Your doctor will talk to you about things you can do to improve your health and lower your risk of health problems. They will also offer help and support. For example, if you want to quit smoking, they can give you advice and might prescribe medicines. If you want to improve your diet or get more physical activity, they can help you with this, too.   Lab tests, if needed - The tests you get will depend on your age and situation. For example, your doctor might want to check your:   Cholesterol   Blood sugar   Iron level   Vaccines - The recommended vaccines will depend on your age, health, and what vaccines you already had. Vaccines are very important because they can prevent certain serious or deadly infections.   Discussion of screening - \"Screening\" means checking for diseases or other health problems before they cause symptoms. Your doctor can recommend screening based on your age, risk, and preferences. This might include tests to check for:   Cancer, such as breast, prostate, cervical, ovarian, colorectal, prostate, lung, or skin cancer   Sexually transmitted infections, such as chlamydia and gonorrhea   Mental health conditions like depression and anxiety  Your doctor will talk to you about the different types of screening tests. They can help you decide which screenings to have. They can also explain what the results might mean.   Answering questions - The physical is a good time to ask the doctor or nurse questions about your health. If needed, they can refer you to other doctors or specialists, too.  Adults older than 65 years often need other care, too. As you get older, your doctor will talk to you about:   How to prevent falling at " home   Hearing or vision tests   Memory testing   How to take your medicines safely   Making sure that you have the help and support you need at home  All topics are updated as new evidence becomes available and our peer review process is complete.  This topic retrieved from "[employer] on: May 02, 2024.  Topic 264475 Version 1.0  Release: 32.4.3 - C32.122  © 2024 UpToDate, Inc. and/or its affiliates. All rights reserved.  Consumer Information Use and Disclaimer   Disclaimer: This generalized information is a limited summary of diagnosis, treatment, and/or medication information. It is not meant to be comprehensive and should be used as a tool to help the user understand and/or assess potential diagnostic and treatment options. It does NOT include all information about conditions, treatments, medications, side effects, or risks that may apply to a specific patient. It is not intended to be medical advice or a substitute for the medical advice, diagnosis, or treatment of a health care provider based on the health care provider's examination and assessment of a patient's specific and unique circumstances. Patients must speak with a health care provider for complete information about their health, medical questions, and treatment options, including any risks or benefits regarding use of medications. This information does not endorse any treatments or medications as safe, effective, or approved for treating a specific patient. Up[employer]Date, Inc. and its affiliates disclaim any warranty or liability relating to this information or the use thereof.The use of this information is governed by the Terms of Use, available at https://www.woltersSwarmBuilduwer.com/en/know/clinical-effectiveness-terms. 2024© UpToDate, Inc. and its affiliates and/or licensors. All rights reserved.  Copyright   © 2024 UpToDate, Inc. and/or its affiliates. All rights reserved.    Patient Education     Vaping   The Basics   Written by the doctors and editors at  "[employer]   What is vaping? -- \"Vaping\" means using electronic cigarettes (\"e-cigarettes\"). These are devices that have a small battery that heats up a liquid to create a vapor, or aerosol. The user then breathes in this vapor, similar to how they would breathe in smoke from a regular (tobacco) cigarette.  There are many different types of e-cigarettes. Some are about the same size and shape as a pen, while others are smaller or differently shaped. Some are larger with replaceable \"vape liquid\" pods. They can also be designed to look like other items, such as [employer] flash drives (figure 1).  Is vaping safer than smoking cigarettes? -- Maybe. Experts don't yet know a lot about the long-term health effects of vaping. But most e-cigarettes and vaping liquids contain nicotine, which is the ingredient in regular cigarettes that makes them addictive.  Even though many e-cigarettes might contain less nicotine than regular cigarettes, this does not mean that they are harmless. In addition to the possible health effects, vaping can lead to nicotine dependence or addiction. This might make a person more likely to start smoking regular cigarettes. Smoking tobacco is known to increase a person's risk of serious health problems including heart disease, lung disease, kidney failure, infection, and cancer.  E-cigarettes can also contain other harmful substances, such as small amounts of metals and chemicals. They can also have other ingredients with unknown health effects.  What problems can vaping cause? -- E-cigarettes have not been in popular use for very long, so experts don't yet know much about how they might affect a person's health over time. But there is evidence that they can cause problems such as:   Nicotine dependence - Nicotine, which is found in most e-cigarettes, is addictive. Even if a person only vapes occasionally, they might find themselves wanting to do it more and more. People who vape a lot can have levels of " nicotine in their body that are similar to those in people who smoke.   Breathing-related symptoms - Some people who vape notice symptoms similar to those of bronchitis. This is caused by irritation of the bronchi, which are the tubes that carry air in and out of the lungs. Symptoms can include cough and coughing up mucus.   Lung damage - There have been cases of lung damage in people who vape. The risk of this seems to be higher when people use vaping devices that have been refilled with other drugs, like THC (the active ingredient in cannabis). The risk is also higher when the vaping liquid contains added ingredients like vitamin E. Breathing in the vapor can lead to symptoms such as cough, chest pain, and trouble breathing. In some cases, lung damage can be severe.   Burns - Vaping devices can cause burns or other injuries.   Nicotine poisoning - The liquid used in e-cigarettes has large amounts of nicotine. Drinking this fluid can cause severe poisoning and death. It's very important to keep e-cigarettes and vape liquid pods out of reach of young children.  What are the risks of vaping for young people? -- The main risk is that young people who vape are more likely to become dependent on nicotine. Nicotine can be harmful to a young person's developing brain. Vaping also increases the risk that they will start smoking regular cigarettes, which can lead to serious health problems.  Many companies that make e-cigarettes and other devices market directly to children and teens. For example, they sell products with different flavors to appeal to young people. Peer pressure can also be a factor, and young people often want to try things that their friends are doing.  If you have or live with children or teens, it's important to be aware that they are likely to hear about vaping or know people who do it. Know what the different devices look like, and talk openly with children and teens about the risks. If they don't  "have access to accurate information, they might think that vaping is harmless.  Can vaping help me quit smoking? -- Sometimes, people who already smoke tobacco cigarettes wonder if vaping can help them quit.  Doctors recommend using medicines and counseling to quit smoking. These methods have been studied the most. But for people who have tried these and not been able to quit, switching to vaping might be an option. There are some things to remember:   Vaping might be less harmful than smoking regular cigarettes. But e-cigarettes still contain nicotine as well as other substances that might be harmful.   If you decide to try vaping to help you quit, it's important to switch completely from regular cigarettes to e-cigarettes. Using both will probably not be helpful, and might increase the risks of harm.   It's not clear how vaping can affect a person's health in the long term.  For these reasons, doctors recommend that if you do try vaping to help you quit smoking, you still make a plan to quit vaping eventually.  If you are interested in quitting smoking, your doctor or nurse can help you make a plan. Quitting isn't easy, but it's one of the best things you can do for your health.  What if I need help quitting vaping? -- If you want to stop vaping but are having trouble, talk to your doctor or nurse. They can help you quit with medicines and/or counseling, similar to quitting smoking.  All topics are updated as new evidence becomes available and our peer review process is complete.  This topic retrieved from [employer] on: Mar 14, 2024.  Topic 860018 Version 6.0  Release: 32.2.4 - C32.72  © 2024 UpToDate, Inc. and/or its affiliates. All rights reserved.  figure 1: Vaping devices     There are many different types of \"e-cigarettes\" and other devices used for vaping. Some look like real cigarettes, while others look like pens or other objects. This graphic shows some examples.  Graphic 224261 Version 1.0  Consumer " Information Use and Disclaimer   Disclaimer: This generalized information is a limited summary of diagnosis, treatment, and/or medication information. It is not meant to be comprehensive and should be used as a tool to help the user understand and/or assess potential diagnostic and treatment options. It does NOT include all information about conditions, treatments, medications, side effects, or risks that may apply to a specific patient. It is not intended to be medical advice or a substitute for the medical advice, diagnosis, or treatment of a health care provider based on the health care provider's examination and assessment of a patient's specific and unique circumstances. Patients must speak with a health care provider for complete information about their health, medical questions, and treatment options, including any risks or benefits regarding use of medications. This information does not endorse any treatments or medications as safe, effective, or approved for treating a specific patient. UpToDate, Inc[employer] and its affiliates disclaim any warranty or liability relating to this information or the use thereof.The use of this information is governed by the Terms of Use, available at https://www.woltersDynamo Mediauwer.com/en/know/clinical-effectiveness-terms. 2024© UpToDate, Inc. and its affiliates and/or licensors. All rights reserved.  Copyright   © 2024 UpToDate, Inc. and/or its affiliates. All rights reserved.

## 2024-12-26 ENCOUNTER — RESULTS FOLLOW-UP (OUTPATIENT)
Dept: FAMILY MEDICINE CLINIC | Facility: CLINIC | Age: 49
End: 2024-12-26

## 2024-12-27 DIAGNOSIS — M77.31 CALCANEAL SPUR OF RIGHT FOOT: Primary | ICD-10-CM

## 2024-12-30 ENCOUNTER — OFFICE VISIT (OUTPATIENT)
Dept: URGENT CARE | Facility: CLINIC | Age: 49
End: 2024-12-30
Payer: COMMERCIAL

## 2024-12-30 VITALS
DIASTOLIC BLOOD PRESSURE: 80 MMHG | OXYGEN SATURATION: 98 % | WEIGHT: 206.8 LBS | BODY MASS INDEX: 35.3 KG/M2 | TEMPERATURE: 97.5 F | HEART RATE: 66 BPM | RESPIRATION RATE: 16 BRPM | HEIGHT: 64 IN | SYSTOLIC BLOOD PRESSURE: 124 MMHG

## 2024-12-30 DIAGNOSIS — J01.00 ACUTE MAXILLARY SINUSITIS, RECURRENCE NOT SPECIFIED: Primary | ICD-10-CM

## 2024-12-30 PROCEDURE — S9083 URGENT CARE CENTER GLOBAL: HCPCS | Performed by: PHYSICIAN ASSISTANT

## 2024-12-30 PROCEDURE — G0382 LEV 3 HOSP TYPE B ED VISIT: HCPCS | Performed by: PHYSICIAN ASSISTANT

## 2024-12-30 RX ORDER — FLUTICASONE PROPIONATE 50 MCG
1 SPRAY, SUSPENSION (ML) NASAL DAILY
Qty: 9.9 ML | Refills: 0 | Status: SHIPPED | OUTPATIENT
Start: 2024-12-30

## 2024-12-30 NOTE — PROGRESS NOTES
"[facility]        NAME: Arleth Shah is a 49 y.o. female  : 1975    MRN: [id number]  DATE: 2024  TIME: 1:43 PM    /80   Pulse 66   Temp 97.5 °F (36.4 °C)   Resp 16   Ht 5' 4\" (1.626 m)   Wt 93.8 kg (206 lb 12.8 oz)   LMP 2022 (Approximate)   SpO2 98%   BMI 35.50 kg/m²     Assessment and Plan   Acute maxillary sinusitis, recurrence not specified [J01.00]  1. Acute maxillary sinusitis, recurrence not specified  amoxicillin-clavulanate (AUGMENTIN) 875-125 mg per tablet    fluticasone (FLONASE) 50 mcg/act nasal spray            Patient Instructions       Follow up with PCP in 3-5 days.  Proceed to  ER if symptoms worsen.    Chief Complaint     Chief Complaint   Patient presents with    Cold Like Symptoms     Sinus congestion starting over a week ago. Post nasal drip, sore throat, weakness, no engery, and productive cough starting 2-3 days ago.          History of Present Illness       Pt with sinus pain and pru        Review of Systems   Review of Systems   Constitutional: Negative.    HENT:  Positive for congestion, sinus pressure and sinus pain.    Eyes: Negative.    Respiratory:  Positive for cough.    Cardiovascular: Negative.    Gastrointestinal: Negative.    Endocrine: Negative.    Genitourinary: Negative.    Musculoskeletal: Negative.    Skin: Negative.    Allergic/Immunologic: Negative.    Neurological: Negative.    Hematological: Negative.    Psychiatric/Behavioral: Negative.     All other systems reviewed and are negative.        Current Medications       Current Outpatient Medications:     Alpha-D-Galactosidase (BEANO PO), Take by mouth, Disp: , Rfl:     amoxicillin-clavulanate (AUGMENTIN) 875-125 mg per tablet, Take 1 tablet by mouth every 12 (twelve) hours for 10 days, Disp: 20 tablet, Rfl: 0    famotidine (PEPCID) 40 MG tablet, Take 1 tablet (40 mg total) by mouth daily at bedtime Take in evening 2 hours prior to bed, Disp: 90 tablet, Rfl: 3    fluticasone " (FLONASE) 50 mcg/act nasal spray, 1 spray into each nostril daily, Disp: 9.9 mL, Rfl: 0    furosemide (LASIX) 20 mg tablet, TAKE 1 TABLET BY MOUTH EVERY DAY, Disp: 90 tablet, Rfl: 1    omeprazole (PriLOSEC) 20 mg delayed release capsule, Take 1 capsule (20 mg total) by mouth 2 (two) times a day 30 minutes before breakfast and 30 minutes before dinner, Disp: 180 capsule, Rfl: 1    rivaroxaban (Xarelto) 15 mg tablet, Take 1 tablet (15 mg total) by mouth daily with breakfast, Disp: 90 tablet, Rfl: 1    Current Allergies     Allergies as of 12/30/2024    (No Known Allergies)            The following portions of the patient's history were reviewed and updated as appropriate: allergies, current medications, past family history, past medical history, past social history, past surgical history and problem list.     Past Medical History:   Diagnosis Date    Colon polyp     Deep vein blood clot of left lower extremity (HCC) 2019    GERD (gastroesophageal reflux disease)        Past Surgical History:   Procedure Laterality Date    TUBAL LIGATION  1998    US BREAST CYST ASPIRATION RIGHT INITIAL Right 07/11/2019       Family History   Problem Relation Age of Onset    Skin cancer Mother     Edema Father     Throat cancer Father     COPD Father     Cancer Father     No Known Problems Sister     No Known Problems Daughter     No Known Problems Maternal Grandmother     Stroke Maternal Grandfather     Alcohol abuse Maternal Grandfather     No Known Problems Paternal Grandmother     No Known Problems Paternal Grandfather     Colon cancer Maternal Uncle     No Known Problems Paternal Aunt     No Known Problems Paternal Aunt     No Known Problems Paternal Aunt     Breast cancer Neg Hx     Breast cancer additional onset Neg Hx     Ovarian cancer Neg Hx     Endometrial cancer Neg Hx     BRCA2 Positive Neg Hx     BRCA2 Negative Neg Hx     BRCA1 Positive Neg Hx     BRCA1 Negative Neg Hx     BRCA 1/2 Neg Hx          Medications have been  "verified.        Objective   /80   Pulse 66   Temp 97.5 °F (36.4 °C)   Resp 16   Ht 5' 4\" (1.626 m)   Wt 93.8 kg (206 lb 12.8 oz)   LMP 05/22/2022 (Approximate)   SpO2 98%   BMI 35.50 kg/m²        Physical Exam     Physical Exam  Vitals and nursing note reviewed.   Constitutional:       Appearance: Normal appearance. She is normal weight.   HENT:      Head: Normocephalic and atraumatic.      Right Ear: Tympanic membrane, ear canal and external ear normal.      Left Ear: Tympanic membrane, ear canal and external ear normal.      Nose: Congestion and rhinorrhea present.      Comments: Boggy mucosa  max sinus tender to palp   yellow d/c      Mouth/Throat:      Mouth: Mucous membranes are moist.      Pharynx: Oropharynx is clear.   Eyes:      Extraocular Movements: Extraocular movements intact.      Conjunctiva/sclera: Conjunctivae normal.      Pupils: Pupils are equal, round, and reactive to light.   Cardiovascular:      Rate and Rhythm: Normal rate.      Pulses: Normal pulses.      Heart sounds: Normal heart sounds.   Pulmonary:      Effort: Pulmonary effort is normal.      Breath sounds: Normal breath sounds.   Abdominal:      General: Abdomen is flat. Bowel sounds are normal.      Palpations: Abdomen is soft.   Musculoskeletal:         General: Normal range of motion.      Cervical back: Normal range of motion and neck supple.   Skin:     General: Skin is warm.      Capillary Refill: Capillary refill takes less than 2 seconds.   Neurological:      Mental Status: She is alert and oriented to person, place, and time.   Psychiatric:         Behavior: Behavior normal.                     "

## 2025-01-07 ENCOUNTER — HOSPITAL ENCOUNTER (OUTPATIENT)
Dept: RADIOLOGY | Facility: CLINIC | Age: 50
Discharge: HOME | End: 2025-01-07
Payer: COMMERCIAL

## 2025-01-07 VITALS — HEIGHT: 64 IN | BODY MASS INDEX: 35.3 KG/M2 | WEIGHT: 206.79 LBS

## 2025-01-07 DIAGNOSIS — Z12.31 ENCOUNTER FOR SCREENING MAMMOGRAM FOR MALIGNANT NEOPLASM OF BREAST: ICD-10-CM

## 2025-01-07 PROCEDURE — 77063 BREAST TOMOSYNTHESIS BI: CPT

## 2025-01-07 PROCEDURE — 77067 SCR MAMMO BI INCL CAD: CPT

## 2025-01-13 ENCOUNTER — APPOINTMENT (OUTPATIENT)
Dept: LAB | Facility: CLINIC | Age: 50
End: 2025-01-13
Payer: COMMERCIAL

## 2025-01-13 DIAGNOSIS — R63.5 WEIGHT GAIN: ICD-10-CM

## 2025-01-13 DIAGNOSIS — Z13.220 SCREENING FOR CHOLESTEROL LEVEL: ICD-10-CM

## 2025-01-13 DIAGNOSIS — Z13.1 SCREENING FOR DIABETES MELLITUS: ICD-10-CM

## 2025-01-13 LAB
ALBUMIN SERPL BCG-MCNC: 4 G/DL (ref 3.5–5)
ALP SERPL-CCNC: 58 U/L (ref 34–104)
ALT SERPL W P-5'-P-CCNC: 20 U/L (ref 7–52)
ANION GAP SERPL CALCULATED.3IONS-SCNC: 8 MMOL/L (ref 4–13)
AST SERPL W P-5'-P-CCNC: 17 U/L (ref 13–39)
BILIRUB SERPL-MCNC: 0.5 MG/DL (ref 0.2–1)
BUN SERPL-MCNC: 17 MG/DL (ref 5–25)
CALCIUM SERPL-MCNC: 8.8 MG/DL (ref 8.4–10.2)
CHLORIDE SERPL-SCNC: 102 MMOL/L (ref 96–108)
CHOLEST SERPL-MCNC: 177 MG/DL (ref ?–200)
CO2 SERPL-SCNC: 28 MMOL/L (ref 21–32)
CREAT SERPL-MCNC: 0.89 MG/DL (ref 0.6–1.3)
EST. AVERAGE GLUCOSE BLD GHB EST-MCNC: 117 MG/DL
GFR SERPL CREATININE-BSD FRML MDRD: 75 ML/MIN/1.73SQ M
GLUCOSE P FAST SERPL-MCNC: 120 MG/DL (ref 65–99)
HBA1C MFR BLD: 5.7 %
HDLC SERPL-MCNC: 53 MG/DL
LDLC SERPL CALC-MCNC: 102 MG/DL (ref 0–100)
NONHDLC SERPL-MCNC: 124 MG/DL
POTASSIUM SERPL-SCNC: 4.1 MMOL/L (ref 3.5–5.3)
PROT SERPL-MCNC: 6.8 G/DL (ref 6.4–8.4)
SODIUM SERPL-SCNC: 138 MMOL/L (ref 135–147)
T4 SERPL-MCNC: 9.48 UG/DL (ref 6.09–12.23)
TRIGL SERPL-MCNC: 108 MG/DL (ref ?–150)
TSH SERPL DL<=0.05 MIU/L-ACNC: 2.05 UIU/ML (ref 0.45–4.5)

## 2025-01-13 PROCEDURE — 84443 ASSAY THYROID STIM HORMONE: CPT

## 2025-01-13 PROCEDURE — 83036 HEMOGLOBIN GLYCOSYLATED A1C: CPT

## 2025-01-13 PROCEDURE — 84436 ASSAY OF TOTAL THYROXINE: CPT

## 2025-01-13 PROCEDURE — 80061 LIPID PANEL: CPT

## 2025-01-13 PROCEDURE — 80053 COMPREHEN METABOLIC PANEL: CPT

## 2025-01-13 PROCEDURE — 36415 COLL VENOUS BLD VENIPUNCTURE: CPT

## 2025-01-15 ENCOUNTER — OFFICE VISIT (OUTPATIENT)
Dept: PODIATRY | Age: 50
End: 2025-01-15
Payer: COMMERCIAL

## 2025-01-15 VITALS — HEIGHT: 64 IN | WEIGHT: 206 LBS | BODY MASS INDEX: 35.17 KG/M2

## 2025-01-15 DIAGNOSIS — M77.31 CALCANEAL SPUR OF RIGHT FOOT: ICD-10-CM

## 2025-01-15 DIAGNOSIS — M79.671 PAIN OF RIGHT HEEL: Primary | ICD-10-CM

## 2025-01-15 PROCEDURE — 99204 OFFICE O/P NEW MOD 45 MIN: CPT | Performed by: STUDENT IN AN ORGANIZED HEALTH CARE EDUCATION/TRAINING PROGRAM

## 2025-01-15 RX ORDER — METHYLPREDNISOLONE 4 MG/1
TABLET ORAL
Qty: 1 EACH | Refills: 0 | Status: SHIPPED | OUTPATIENT
Start: 2025-01-15

## 2025-01-15 NOTE — PROGRESS NOTES
Name: Arleth Shah      : 1975      MRN: [id number]  Encounter Provider: Alicia Rivera DPM  Encounter Date: 1/15/2025   Encounter department: [facility] [place]  :  Assessment & Plan  Pain of right heel    Orders:    Ambulatory referral to Physical Therapy; Future    methylPREDNISolone 4 MG tablet therapy pack; Use as directed on package    Calcaneal spur of right foot    Orders:    Ambulatory Referral to Podiatry    Ambulatory referral to Physical Therapy; Future    methylPREDNISolone 4 MG tablet therapy pack; Use as directed on package    Imaging Reviewed at this visit (I personally reviewed/independently interpreted images and reports in PACS)  XR right foot NWB 3v 24: No acute osseous abnormalities noted. Small plantar and posterior enthesophytes. TNJ arthritic changes. Exam limited given it is not WB.        PLAN:  I reviewed clinical exam and radiographic imaging (XR) with patient in detail today. I have discussed with the patient the pathophysiology of this diagnosis and reviewed how the examination correlates with this diagnosis.  PCP note from 24 reviewed    I explained at length the biomechanical abnormalities leading to the significant overload of the plantar fascia. I stressed the importance of proper shoe gear and OTC arch supports to reposition foot to reduce tension on soft tissue structures and give cushion.  Instructions were given for conservative care which was also demonstrated during the clinical visit. I stressed the importance of achilles tendon stretching, icing and applying voltarin gel or biofreeze (OTC) at least 3x/day.    I recommend stiff bottomed sneakers/shoes (ex Asics, Vionic, New balance, Holley, etc) for daily use and Odilia Amaya (recovery slides) for in home use.   I advised pt to obtain OTC Vasyli-Dananberg arch supports  I ordered the addition of PT at this visit to aide in reduction of equinus and also address the plantar fascia and  "gastroc aponeurosis with graston technique.   I rx'd medrol dose pack today  CSI prn  RTC 2 months (CSI vs MRI)    History of Present Illness   HPI  Arleth Shah is a 49 y.o. female who presents to clinic for right foot pain. Pain present since September 2024 without h/o trauma. Daily, worse at first step from rise and after long shifts at work. Does have recent weight gain. Had XR ordered per PCP. Works 12hrs on feet. Wearing non-supportive shoes today. Cannot take NSAIDs due to stomach ulcer.      Review of Systems   Constitutional:  Negative for activity change, chills and fever.   HENT: Negative.     Respiratory:  Negative for cough, chest tightness and shortness of breath.    Cardiovascular:  Negative for chest pain and leg swelling.   Endocrine: Negative.    Genitourinary: Negative.    Musculoskeletal:  Positive for gait problem.   Neurological:  Negative for numbness.   Psychiatric/Behavioral: Negative.  Negative for agitation and behavioral problems.           Objective   Ht 5' 4\" (1.626 m)   Wt 93.4 kg (206 lb)   LMP 05/22/2022 (Approximate)   BMI 35.36 kg/m²      Physical Exam  Constitutional:       General: She is not in acute distress.     Appearance: Normal appearance. She is not ill-appearing.   Cardiovascular:      Pulses: Normal pulses.   Pulmonary:      Effort: Pulmonary effort is normal.   Musculoskeletal:         General: Tenderness (Right heel at insertion of plantar fascia to calcaneus.) present.      Comments: MMT 5/5 in all planes. Toe and ankle ROM intact and without pain.   Gastroc-soleal equinus.    Skin:     General: Skin is warm and dry.      Findings: No erythema or lesion.   Neurological:      General: No focal deficit present.      Mental Status: She is alert and oriented to person, place, and time.      Sensory: No sensory deficit.           "

## 2025-01-15 NOTE — PATIENT INSTRUCTIONS
Foot Pain Home Therapy    Stretch. Place painful foot in back with heel on ground and lean against wall. Do not lift heel off of ground. You will feel the stretch in the calf muscles and possibly the heel. Hold the stretch for 20-30 seconds. Do this at least 5-6 times per day. It is best done after exercise when your muscles are warm.  Wear supportive shoes at all times. Avoid flip-flops, flat sandals, barefoot walking (never walk barefoot, even at home). Generally avoid shoes that are too flexible and bend in the arch. Your shoes should only slightly bend in the toe area, not the middle. Running sneakers are often the best choice. Soft or no back may be beneficial when driving.   Supportive sneaker brands: Holley, On Cloud, Hoka, Altra, New Balance, Asics, Mizuno  [employer] (discount if mention Dr humberto)  [facility]et Feet [facility]   [employer] [place]  Supportive daily shoe brands: Vionic, Orthofeet, Diamond, Dansko, Chacos, Worley, Teva, Birkenstock  Supportive home shoes: Odilia Amaya (recovery slides)  Purchase over the counter topical pain creams such as Voltarin gel, biofreeze, or CBD cream - will need to apply 2-3 times per day for benefit. + deep tissue massage.   Look in to over the counter shoe inserts/arch supports such as Vasyli-Dananberg (remove tabs under 1st toe) arch supports. These are all available on [employer].com as well as their individual website's.   Anchorâ„¢: Vasyli+Dananberg 1st Ray Orthotic, Medium, 1st Ray Function, Medium Density, Full-Length Insole, Heat Molding Optional, Best All Around Orthotic, Functional Biomechanical Control for Pain Relief, Black Yellow (00283) : Health & Household    VASYLI + Dananberg -- Vasyli [employer]       Achilles Tendon Stretching Exercises    A) Standing Gastrocnemius stretch  Place hands on wall or chair. If using wall, put your hands at eye level.  Step the leg you want to stretch behind you. Keep your back heel on  the floor and point your toes straight ahead or slightly inward towards the heel of the opposite foot.   Bend your knee toward the wall while keeping your back leg straight.  Lean toward the wall until you feel a gentle stretch in you calf of the straight leg. Don't lean so far that you feel pain.  Hold for 15 seconds. Complete 3 reps.    B) Standing soleus stretch  Place your hands on the wall or chair. If using wall, put your hands at eye level.  Step the leg you want to stretch behind you (your back foot will need to be closer to the front foot than the above stretch). Keep your back heel on the floor and point your toes straight ahead or slightly inward towards the heel of the opposite foot.   Bend both your front and back knee at the same time (may help to stick your butt out). You do not need to lean towards the wall, just bend the knees.   Lean toward the wall until you feel a gentle stretch in you calf of the straight leg. Don't lean so far that you feel pain.  Hold for 15 seconds. Complete 3 reps.          Keep these tips and tricks in mind to get the most out of your stretching;  Take your time - move slowly, whether you are deepening into a stretch or changing positions. This will limit the risk of injury & discomfort.  Avoid bouncing - quick sudden movements will only worsen achilles tendon issues. Stay relaxed during stretch.  Keep your heel down and toes straight ahead or slightly inward - this will allow the achilles tendon to stretch properly.   Stop if you feel pain - Don't strain or force your muscle. If you feel sharp pain, stop immediately.

## 2025-01-30 DIAGNOSIS — I82.402 DEEP VEIN THROMBOSIS (DVT) OF LEFT LOWER EXTREMITY, UNSPECIFIED CHRONICITY, UNSPECIFIED VEIN (HCC): ICD-10-CM

## 2025-01-30 RX ORDER — RIVAROXABAN 15 MG/1
15 TABLET, FILM COATED ORAL
Qty: 30 TABLET | Refills: 0 | Status: SHIPPED | OUTPATIENT
Start: 2025-01-30

## 2025-02-05 NOTE — PROGRESS NOTES
PT Evaluation     Today's date: 2025  Patient name: Arleth Shah  : 1975  MRN: [id number]  Referring provider: Alicia Rivera DPM  Dx:   Encounter Diagnosis     ICD-10-CM    1. Pain of right heel  M79.671 Ambulatory referral to Physical Therapy      2. Calcaneal spur of right foot  M77.31 Ambulatory referral to Physical Therapy          Start Time: 1450  Stop Time: 1530  Total time in clinic (min): 40 minutes    Assessment  Impairments: abnormal gait, abnormal or restricted ROM, activity intolerance, impaired physical strength, lacks appropriate home exercise program, pain with function, weight-bearing intolerance, participation limitations and activity limitations    Assessment details: Patient is a 50 y/o female presenting to OP PT w/ c/o R heel pain. Patient presents with tenderness throughout the plantar fascia of her R foot, hypomobility within midfoot, forefoot, and talocrural joint, pain with passive dorsiflexion and subsequent AROM deficits. Patient was educated in anatomy of plantar fascia and pathophysiology and recommended to complete 4-6 week course of PT to focus on mobility and reduction of stress on plantar fascia. Patient also completed shockwave therapy treatment to day with improvement in symptoms post treatment. Patient is agreeable to PT and will be seen 1x/week to improve weight bearing tolerance to reduce pain with household and work duties.     Goals  STG to be met within 4 weeks:  1. Pt will report 2/10 worst pain to improve functional mobility.  2. Pt will improve R ankle strengthening by 1/2 muscle grade to improve gait pattern.  3. Pt will improve R dorsiflexion AROM by 5* to improve functional mobility.  4. Pt will be I in HEP at .    LTG to be met within 6 weeks:  1. Pt will restore R ankle strengthening to WFL to improve functional mobility.  2. Pt will restore R dorsiflexion AROM to WFL to improve functional mobility.  3. Pt will verbalize little to no pain with work  duties to improve QoL.  4. Pt will be I in HEP for DC.       Plan  Patient would benefit from: skilled physical therapy and PT eval  Planned modality interventions: cryotherapy, thermotherapy: hydrocollator packs and manual electrical stimulation    Planned therapy interventions: balance/weight bearing training, flexibility, functional ROM exercises, home exercise program, joint mobilization, IASTM, manual therapy, neuromuscular re-education, patient/caregiver education, self care, strengthening, stretching and therapeutic exercise    Frequency: 1x week  Duration in weeks: 6  Treatment plan discussed with: patient      Subjective Evaluation    History of Present Illness  Mechanism of injury: Patient reports she has been having pain in her R foot since at least 2024 and was seen by her PCP in December where she underwent imaging and found she has a bone spur. She was referred to DPM who recommended she purchase supportive shoes and insoles to help reduce biomechanical deformities and pain during work shifts. Patient states she is in the process of funding shoes that she wants but will look soon. She states she has most of her pain in the bottom of her heel when she is on her feet for long periods of time and when she takes her first step after sitting for a period of time. She denies N/T.  Patient Goals  Patient goals for therapy: decreased pain, increased motion and increased strength    Pain  Current pain ratin  At best pain ratin  At worst pain rating: 10  Quality: sharp, dull ache and pressure  Relieving factors: medications (stretching)  Aggravating factors: stair climbing, walking and standing  Progression: worsening    Treatments  Current treatment: physical therapy        Objective     Palpation     Additional Palpation Details  No palpable tenderness    Tenderness     Right Ankle/Foot   Tenderness in the Achilles insertion and plantar fascia.     Active Range of Motion     Right Ankle/Foot    Dorsiflexion (ke): -2 degrees   Plantar flexion: WFL  Inversion: WFL  Eversion: WFL    Joint Play     Right Ankle/Foot  Hypomobile in the talocrural joint, subtalar joint, midfoot and forefoot.     Strength/Myotome Testing     Right Ankle/Foot   Dorsiflexion: 4+  Plantar flexion: 5  Inversion: 4+  Eversion: 4+    Tests     Right Ankle/Foot   Positive for windlass.              Precautions: H/o DVT  POC Expiration: 3/20/25      Manuals 2/6       Ankle mobs/MREs/ stretching        SWT plantar fascia 10' R plantar fascia red tip, 1.0 bar       Graston plantar fascia                Neuro Re-Ed        HR/TR        Step ups fwd/ lateral        BOSU marches        BOSU 3 way SLR        Rocker board Fwd/Bwd & S/S                                Ther Ex        SRC for ROM/Strength        Ankle IE c DF/PF        Gastroc/soleus stretch on step         Leg press                                HEP instruction/ education 5'       Ther Activity                        Gait Training                        Modalities        CP

## 2025-02-06 ENCOUNTER — EVALUATION (OUTPATIENT)
Dept: PHYSICAL THERAPY | Facility: CLINIC | Age: 50
End: 2025-02-06
Payer: COMMERCIAL

## 2025-02-06 DIAGNOSIS — M77.31 CALCANEAL SPUR OF RIGHT FOOT: ICD-10-CM

## 2025-02-06 DIAGNOSIS — M79.671 PAIN OF RIGHT HEEL: ICD-10-CM

## 2025-02-06 PROCEDURE — 97140 MANUAL THERAPY 1/> REGIONS: CPT

## 2025-02-06 PROCEDURE — 97161 PT EVAL LOW COMPLEX 20 MIN: CPT

## 2025-02-13 ENCOUNTER — OFFICE VISIT (OUTPATIENT)
Dept: PHYSICAL THERAPY | Facility: CLINIC | Age: 50
End: 2025-02-13
Payer: COMMERCIAL

## 2025-02-13 DIAGNOSIS — M77.31 CALCANEAL SPUR OF RIGHT FOOT: ICD-10-CM

## 2025-02-13 DIAGNOSIS — M79.671 PAIN OF RIGHT HEEL: Primary | ICD-10-CM

## 2025-02-13 PROCEDURE — 97140 MANUAL THERAPY 1/> REGIONS: CPT

## 2025-02-13 PROCEDURE — 97110 THERAPEUTIC EXERCISES: CPT

## 2025-02-13 PROCEDURE — 97112 NEUROMUSCULAR REEDUCATION: CPT

## 2025-02-13 NOTE — PROGRESS NOTES
"Daily Note     Today's date: 2025  Patient name: Arleth Shah  : 1975  MRN: [id number]  Referring provider: Alicia Rivera DPM  Dx:   Encounter Diagnosis     ICD-10-CM    1. Pain of right heel  M79.671       2. Calcaneal spur of right foot  M77.31           Start Time: 1450  Stop Time: 1530  Total time in clinic (min): 40 minutes    Subjective: Patient reports she felt better after the first treatment session and also purchased new Hokas which are improving her pain. She continues to have pain with work duties, however.      Objective: See treatment diary below      Assessment: Tolerated treatment well. Patient was progressed in shockwave therapy treatment and toelrated progression well. Patient also initiated POC with stretching and ankle mobility with no adverse effects. Patient demonstrated fatigue post treatment, exhibited good technique with therapeutic exercises, and would benefit from continued PT to improve R ankle mobility and calf musculature lengthening to reduce pain with weight bearing activities.       Plan: Continue per plan of care.      Precautions: H/o DVT  POC Expiration: 3/20/25      Manuals       Ankle mobs/MREs/ stretching  10'      SWT plantar fascia 10' R plantar fascia red tip, 1.0 bar 5' R PF red tip, 1.3 bar      Graston plantar fascia                Neuro Re-Ed        HR/TR        Step ups fwd/ lateral        BOSU marches        BOSU 3 way SLR        Rocker board Fwd/Bwd & S/S  20x ea                               Ther Ex        SRC for ROM/Strength  10' L1      Ankle IE c DF/PF        Gastroc/soleus stretch on step   5x15\" hold ea R only       Leg press                                HEP instruction/ education 5'       Ther Activity                        Gait Training                        Modalities        CP  declined                    "

## 2025-02-14 ENCOUNTER — TELEPHONE (OUTPATIENT)
Dept: GASTROENTEROLOGY | Facility: CLINIC | Age: 50
End: 2025-02-14

## 2025-02-14 NOTE — TELEPHONE ENCOUNTER
Called patient regarding her F/U ov that was scheduled on 6/9/25 with KASANDRA Fuentes in . Provider will not be available. Rescheduled 6/9/25 f/u ov to 6/11/25 with Dr. Keane at 8:30 AM in . LVM for pt to confirm.

## 2025-02-20 ENCOUNTER — OFFICE VISIT (OUTPATIENT)
Dept: PHYSICAL THERAPY | Facility: CLINIC | Age: 50
End: 2025-02-20
Payer: COMMERCIAL

## 2025-02-20 DIAGNOSIS — M77.31 CALCANEAL SPUR OF RIGHT FOOT: ICD-10-CM

## 2025-02-20 DIAGNOSIS — M79.671 PAIN OF RIGHT HEEL: Primary | ICD-10-CM

## 2025-02-20 PROCEDURE — 97110 THERAPEUTIC EXERCISES: CPT

## 2025-02-20 PROCEDURE — 97112 NEUROMUSCULAR REEDUCATION: CPT

## 2025-02-20 PROCEDURE — 97140 MANUAL THERAPY 1/> REGIONS: CPT

## 2025-02-20 NOTE — PROGRESS NOTES
"Daily Note     Today's date: 2025  Patient name: Arleth Shah  : 1975  MRN: [id number]  Referring provider: Alicia Rivera DPM  Dx:   Encounter Diagnosis     ICD-10-CM    1. Pain of right heel  M79.671       2. Calcaneal spur of right foot  M77.31                      Subjective: Patient reports that she is having a pretty good day today.      Objective: See treatment diary below      Assessment: Tolerated treatment well. Patient exhibited good technique with therapeutic exercises and would benefit from continued PT to increase R foot ROM/strength and endurance to improve her mobility.  Patient notes improved mobility and decreased discomfort post manual txs today.      Plan: Continue per plan of care.      Precautions: H/o DVT  POC Expiration: 3/20/25      Manuals      Ankle mobs/MREs/ stretching  10' 10' c STM     SWT plantar fascia 10' R plantar fascia red tip, 1.0 bar 5' R PF red tip, 1.3 bar 5' R heel Red tip, 1.3Bar     Graston plantar fascia                Neuro Re-Ed        HR/TR   2x10 3\"hold 4\"step     Step ups fwd/ lateral        BOSU marches   2'     BOSU 3 way SLR   10xea bilat     Rocker board Fwd/Bwd & S/S  20x ea  20xea                             Ther Ex        SRC for ROM/Strength  10' L1 10'L3     Ankle IE c DF/PF        Gastroc/soleus stretch on step   5x15\" hold ea R only  5x15\"ea bilat 4\"step     Leg press                                HEP instruction/ education 5'       Ther Activity                        Gait Training                        Modalities        CP  declined                    "

## 2025-02-24 DIAGNOSIS — I82.402 DEEP VEIN THROMBOSIS (DVT) OF LEFT LOWER EXTREMITY, UNSPECIFIED CHRONICITY, UNSPECIFIED VEIN (HCC): ICD-10-CM

## 2025-02-26 RX ORDER — RIVAROXABAN 15 MG/1
15 TABLET, FILM COATED ORAL
Qty: 30 TABLET | Refills: 3 | Status: SHIPPED | OUTPATIENT
Start: 2025-02-26 | End: 2025-03-05 | Stop reason: SDUPTHER

## 2025-02-27 ENCOUNTER — OFFICE VISIT (OUTPATIENT)
Dept: PHYSICAL THERAPY | Facility: CLINIC | Age: 50
End: 2025-02-27
Payer: COMMERCIAL

## 2025-02-27 DIAGNOSIS — M79.671 PAIN OF RIGHT HEEL: Primary | ICD-10-CM

## 2025-02-27 DIAGNOSIS — M77.31 CALCANEAL SPUR OF RIGHT FOOT: ICD-10-CM

## 2025-02-27 PROCEDURE — 97112 NEUROMUSCULAR REEDUCATION: CPT

## 2025-02-27 PROCEDURE — 97110 THERAPEUTIC EXERCISES: CPT

## 2025-02-27 PROCEDURE — 97140 MANUAL THERAPY 1/> REGIONS: CPT

## 2025-02-27 NOTE — PROGRESS NOTES
"Daily Note     Today's date: 2025  Patient name: Arleht Shah  : 1975  MRN: [id number]  Referring provider: Alicia Rivera DPM  Dx:   Encounter Diagnosis     ICD-10-CM    1. Pain of right heel  M79.671       2. Calcaneal spur of right foot  M77.31                      Subjective: Patient reports that at this time she is feeling no worse, but no better as well.  \"I felt really good after last visit, but then it came right back.\"      Objective: See treatment diary below      Assessment: Tolerated treatment well. Patient exhibited good technique with therapeutic exercises and would benefit from continued PT to increase R foot ROM/strength and endurance to improve her mobility and decrease her discomfort.  Patient able to tolerate increase in bar setting on SWT today in hopes of improving her symptoms.      Plan: Continue per plan of care.      Precautions: H/o DVT  POC Expiration: 3/20/25      Manuals     Ankle mobs/MREs/ stretching  10' 10' c STM 10' c STM    SWT plantar fascia 10' R plantar fascia red tip, 1.0 bar 5' R PF red tip, 1.3 bar 5' R heel Red tip, 1.3Bar 5' R heel RedTip 5Bar    Graston plantar fascia                Neuro Re-Ed        HR/TR   2x10 3\"hold 4\"step 2x10 3\"hold 4\"step    Step ups fwd/ lateral        BOSU marches   2' 3'    BOSU 3 way SLR   10xea bilat 10xea bilat    Rocker board Fwd/Bwd & S/S  20x ea  20xea 20xea                            Ther Ex        SRC for ROM/Strength  10' L1 10'L3 10'L5    Ankle IE c DF/PF    2x10ea 3\"hold RTB    Gastroc/soleus stretch on step   5x15\" hold ea R only  5x15\"ea bilat 4\"step 5x15\"ea bilat 4\"step    Leg press                                HEP instruction/ education 5'       Ther Activity                        Gait Training                        Modalities        CP  declined                    "

## 2025-03-05 ENCOUNTER — OFFICE VISIT (OUTPATIENT)
Dept: PHYSICAL THERAPY | Facility: CLINIC | Age: 50
End: 2025-03-05
Payer: COMMERCIAL

## 2025-03-05 DIAGNOSIS — Z00.6 ENCOUNTER FOR EXAMINATION FOR NORMAL COMPARISON OR CONTROL IN CLINICAL RESEARCH PROGRAM: ICD-10-CM

## 2025-03-05 DIAGNOSIS — R63.5 WEIGHT GAIN: ICD-10-CM

## 2025-03-05 DIAGNOSIS — M77.31 CALCANEAL SPUR OF RIGHT FOOT: ICD-10-CM

## 2025-03-05 DIAGNOSIS — I82.402 DEEP VEIN THROMBOSIS (DVT) OF LEFT LOWER EXTREMITY, UNSPECIFIED CHRONICITY, UNSPECIFIED VEIN (HCC): ICD-10-CM

## 2025-03-05 DIAGNOSIS — M79.671 PAIN OF RIGHT HEEL: Primary | ICD-10-CM

## 2025-03-05 PROCEDURE — 97110 THERAPEUTIC EXERCISES: CPT

## 2025-03-05 PROCEDURE — 97112 NEUROMUSCULAR REEDUCATION: CPT

## 2025-03-05 PROCEDURE — 97140 MANUAL THERAPY 1/> REGIONS: CPT

## 2025-03-05 RX ORDER — PHENTERMINE HYDROCHLORIDE 15 MG/1
15 CAPSULE ORAL EVERY MORNING
Qty: 30 CAPSULE | Refills: 0 | Status: SHIPPED | OUTPATIENT
Start: 2025-03-05

## 2025-03-05 NOTE — PROGRESS NOTES
"Daily Note     Today's date: 3/5/2025  Patient name: Arleth Shah  : 1975  MRN: [id number]  Referring provider: Alicia Rivera DPM  Dx:   Encounter Diagnosis     ICD-10-CM    1. Pain of right heel  M79.671       2. Calcaneal spur of right foot  M77.31                      Subjective: Patient reports that she had a painful day at work Saturday with her R foot and L knee.  \"I did well before and after that.\"  Patient reports that she is doing well this am.  \"I feel like the stiffness is out of my there, but the pain is still there.\"      Objective: See treatment diary below      Assessment: Tolerated treatment well. Patient exhibited good technique with therapeutic exercises and would benefit from continued PT to increase R foot ROM/strength and endurance to improve her mobility and to decrease her discomfort.  Patient able to tolerate her therapeutic ex session well c no reports of exacerbation of symptoms during/post.      Plan: Continue per plan of care.      Precautions: H/o DVT  POC Expiration: 3/20/25      Manuals 2/6 2/13 2/20 2/27 3/5   Ankle mobs/MREs/ stretching  10' 10' c STM 10' c STM 5' c STM   SWT plantar fascia 10' R plantar fascia red tip, 1.0 bar 5' R PF red tip, 1.3 bar 5' R heel Red tip, 1.3Bar 5' R heel RedTip 5Bar 10' R heel Red Tip 5.0bar R heel/3.0bar plantar fascia   Graston plantar fascia                Neuro Re-Ed        HR/TR   2x10 3\"hold 4\"step 2x10 3\"hold 4\"step 2x10 3\"hold 4\"step   Step ups fwd/ lateral        BOSU marches   2' 3' 3'   BOSU 3 way SLR   10xea bilat 10xea bilat 10xea bilat   Rocker board Fwd/Bwd & S/S  20x ea  20xea 20xea 20xea                           Ther Ex        SRC for ROM/Strength  10' L1 10'L3 10'L5 10'L5   Ankle IE c DF/PF    2x10ea 3\"hold RTB 2x10ea 3\"hold RTB   Gastroc/soleus stretch on step   5x15\" hold ea R only  5x15\"ea bilat 4\"step 5x15\"ea bilat 4\"step 5x15\"ea bilat 4\"step   Leg press                                HEP instruction/ education 5'     "   Ther Activity                        Gait Training                        Modalities        CP  declined

## 2025-03-10 ENCOUNTER — APPOINTMENT (OUTPATIENT)
Dept: LAB | Facility: CLINIC | Age: 50
End: 2025-03-10

## 2025-03-10 DIAGNOSIS — Z00.6 ENCOUNTER FOR EXAMINATION FOR NORMAL COMPARISON OR CONTROL IN CLINICAL RESEARCH PROGRAM: ICD-10-CM

## 2025-03-10 PROCEDURE — 36415 COLL VENOUS BLD VENIPUNCTURE: CPT

## 2025-03-12 ENCOUNTER — OFFICE VISIT (OUTPATIENT)
Dept: PHYSICAL THERAPY | Facility: CLINIC | Age: 50
End: 2025-03-12
Payer: COMMERCIAL

## 2025-03-12 DIAGNOSIS — M77.31 CALCANEAL SPUR OF RIGHT FOOT: ICD-10-CM

## 2025-03-12 DIAGNOSIS — M79.671 PAIN OF RIGHT HEEL: Primary | ICD-10-CM

## 2025-03-12 PROCEDURE — 97110 THERAPEUTIC EXERCISES: CPT

## 2025-03-12 PROCEDURE — 97140 MANUAL THERAPY 1/> REGIONS: CPT

## 2025-03-12 PROCEDURE — 97112 NEUROMUSCULAR REEDUCATION: CPT

## 2025-03-12 NOTE — PROGRESS NOTES
"Daily Note     Today's date: 3/12/2025  Patient name: Arleth Shah  : 1975  MRN: [id number]  Referring provider: Alicia Rivera DPM  Dx:   Encounter Diagnosis     ICD-10-CM    1. Pain of right heel  M79.671       2. Calcaneal spur of right foot  M77.31                      Subjective: Patient reports that she felt real good post last visit.  \"It's still feeling good.  Work is where I really mind it.\"      Objective: See treatment diary below      Assessment: Tolerated treatment well. Patient exhibited good technique with therapeutic exercises and would benefit from continued PT to increase R foot ROM/strength and endurance to improve her mobility and improve her discomfort.  Patient to call post dr crowley Monday for further scheduling.      Plan: Continue per plan of care.      Precautions: H/o DVT  POC Expiration: 3/20/25      Manuals 2/13 2/20 2/27 3/5 3/12   Ankle mobs/MREs/ stretching 10' 10' c STM 10' c STM 5' c STM 5'   SWT plantar fascia 5' R PF red tip, 1.3 bar 5' R heel Red tip, 1.3Bar 5' R heel RedTip 5Bar 10' R heel Red Tip 5.0bar R heel/3.0bar plantar fascia 5' R heel Red Tip 5.0bar,   5' 3.0bar plantar fascia   Graston plantar fascia                Neuro Re-Ed        HR/TR  2x10 3\"hold 4\"step 2x10 3\"hold 4\"step 2x10 3\"hold 4\"step 2x10 3\"hold    Step ups fwd/ lateral        BOSU march  2' 3' 3' 3'   BOSU 3 way SLR  10xea bilat 10xea bilat 10xea bilat 15xea bilat   Rocker board Fwd/Bwd & S/S 20x ea  20xea 20xea 20xea 20xea                           Ther Ex        SRC for ROM/Strength 10' L1 10'L3 10'L5 10'L5 10'L7   Ankle IE c DF/PF   2x10ea 3\"hold RTB 2x10ea 3\"hold RTB 2x10ea 3\"hold RTB   Gastroc/soleus stretch on step  5x15\" hold ea R only  5x15\"ea bilat 4\"step 5x15\"ea bilat 4\"step 5x15\"ea bilat 4\"step 5x15\"ea bilat 4\"step   Leg press                                HEP instruction/ education        Ther Activity                        Gait Training                        Modalities        CP " declined

## 2025-03-16 DIAGNOSIS — R10.13 DYSPEPSIA: ICD-10-CM

## 2025-03-17 ENCOUNTER — OFFICE VISIT (OUTPATIENT)
Dept: PODIATRY | Age: 50
End: 2025-03-17
Payer: COMMERCIAL

## 2025-03-17 VITALS — WEIGHT: 210 LBS | BODY MASS INDEX: 35.85 KG/M2 | HEIGHT: 64 IN

## 2025-03-17 DIAGNOSIS — M79.671 PAIN OF RIGHT HEEL: Primary | ICD-10-CM

## 2025-03-17 PROCEDURE — 99213 OFFICE O/P EST LOW 20 MIN: CPT | Performed by: STUDENT IN AN ORGANIZED HEALTH CARE EDUCATION/TRAINING PROGRAM

## 2025-03-17 PROCEDURE — 20550 NJX 1 TENDON SHEATH/LIGAMENT: CPT | Performed by: STUDENT IN AN ORGANIZED HEALTH CARE EDUCATION/TRAINING PROGRAM

## 2025-03-17 RX ORDER — LIDOCAINE HYDROCHLORIDE 10 MG/ML
0.5 INJECTION, SOLUTION INFILTRATION; PERINEURAL
Status: SHIPPED | OUTPATIENT
Start: 2025-03-17

## 2025-03-17 RX ORDER — TRIAMCINOLONE ACETONIDE 40 MG/ML
0.5 INJECTION, SUSPENSION INTRA-ARTICULAR; INTRAMUSCULAR
Status: SHIPPED | OUTPATIENT
Start: 2025-03-17

## 2025-03-17 RX ORDER — BETAMETHASONE SODIUM PHOSPHATE AND BETAMETHASONE ACETATE 3; 3 MG/ML; MG/ML
0.5 INJECTION, SUSPENSION INTRA-ARTICULAR; INTRALESIONAL; INTRAMUSCULAR; SOFT TISSUE
Status: SHIPPED | OUTPATIENT
Start: 2025-03-17

## 2025-03-17 RX ORDER — FAMOTIDINE 40 MG/1
40 TABLET, FILM COATED ORAL
Qty: 90 TABLET | Refills: 1 | Status: SHIPPED | OUTPATIENT
Start: 2025-03-17

## 2025-03-17 RX ADMIN — LIDOCAINE HYDROCHLORIDE 0.5 ML: 10 INJECTION, SOLUTION INFILTRATION; PERINEURAL at 08:00

## 2025-03-17 RX ADMIN — BETAMETHASONE SODIUM PHOSPHATE AND BETAMETHASONE ACETATE 0.5 ML: 3; 3 INJECTION, SUSPENSION INTRA-ARTICULAR; INTRALESIONAL; INTRAMUSCULAR; SOFT TISSUE at 08:00

## 2025-03-17 RX ADMIN — TRIAMCINOLONE ACETONIDE 0.5 ML: 40 INJECTION, SUSPENSION INTRA-ARTICULAR; INTRAMUSCULAR at 08:00

## 2025-03-17 NOTE — PROGRESS NOTES
Name: Arleth Shah      : 1975      MRN: [id number]  Encounter Provider: Alicia Rivera DPM  Encounter Date: 3/17/2025   Encounter department: [facility] [place]  :  Assessment & Plan  Pain of right heel    Orders:    Orthotics B/L      Prior Imaging  XR right foot NWB 3v 24: No acute osseous abnormalities noted. Small plantar and posterior enthesophytes. TNJ arthritic changes. Exam limited given it is not WB.        PLAN:  Right foot pain with some improvement however still present. C/w PT/HEP. Get OTC inserts or CMOs (if covered by insurance). CSI performed as below.   C/w HEP/achilles stretching  C/w stiff bottomed sneakers/shoes (ex Asics, Vionic, New balance, Holley, etc) for daily use and Oofos, Hoka (recovery slides) for in home use.   I again advised pt to obtain OTC Vasyli-Dananberg arch supports. CMOs rx'd via [facility] PT with 1st ray cutout if covered by insurance  PT notes reviewed to date  RTC 6wks (repeat CSI vs MRI)    Foot/lower extremity injection    Performed by: Alicia Rivera DPM  Authorized by: Alicia Rivera DPM    Procedure:     Other Assisting Provider: No      Verbal consent obtained?: Yes      Risks and benefits: Risks, benefits and alternatives were discussed      Consent given by:  Patient    Time out: Immediately prior to the procedure a time out was called      Patient states understanding of procedure being performed: Yes      Patient's understanding of procedure matches consent: Yes      Patient identity confirmed:  Verbally with patient    Supporting Documentation:     Indications:  Pain    Procedure Details:    Prep: patient was prepped and draped in usual sterile fashion                Ethyl Chloride was applied      Needle size: 27 G G    Ultrasound Guidance: no      Approach:  Plantar    Laterality:  Right    Cyst Aspiration/Injection: No      Location: aponeurosis      Aponeurosis Structures: Plantar fascia origin      Injection Information:       " Medications:  0.5 mL lidocaine 1 %; 0.5 mL betamethasone acetate-betamethasone sodium phosphate 6 (3-3) mg/mL; 0.5 mL triamcinolone acetonide 40 mg/mL    Patient tolerance:  Patient tolerated the procedure well with no immediate complications    Comments:      After reviewing risks (pain, steroid flare, infection, plantar fascial injury/tear, skin thinning) and obtaining verbal consent, I cleaned skin with alcohol and injected right heel with 0.5cc kenalog 40 + 0.5cc betaamethasone + 0.5cc lidocaine plain.  Oral directions were given for rest and ice on the affected heel. The ice is to be used for approximately 20 minutes at a time, 1-2 times a day for a few days. The patient is to call at once if there is any increased pain, swelling, tenderness, redness, etc.        History of Present Illness   HPI  Arleth Shah is a 49 y.o. female who presents to clinic for right foot pain f/u. Notes pain has been improving slightly with Hoka sneakers and PT. The medrol dose pack significantly helped when taking but pain returned once she stopped. Did not get the arch supports. Denies back pain.     Initial HPI Pain present since September 2024 without h/o trauma. Daily, worse at first step from rise and after long shifts at work. Does have recent weight gain. Had XR ordered per PCP. Works 12hrs on feet. Wearing non-supportive shoes today. Cannot take NSAIDs due to stomach ulcer.\"      Review of Systems   Constitutional:  Negative for activity change, chills and fever.   HENT: Negative.     Respiratory:  Negative for cough, chest tightness and shortness of breath.    Cardiovascular:  Negative for chest pain and leg swelling.   Endocrine: Negative.    Genitourinary: Negative.    Musculoskeletal:  Positive for gait problem.   Neurological:  Negative for numbness.   Psychiatric/Behavioral: Negative.  Negative for agitation and behavioral problems.           Objective   Ht 5' 4\" (1.626 m)   Wt 95.3 kg (210 lb)   LMP 05/22/2022 " (Approximate)   BMI 36.05 kg/m²      Physical Exam  Constitutional:       General: She is not in acute distress.     Appearance: Normal appearance. She is not ill-appearing.   Cardiovascular:      Pulses: Normal pulses.   Pulmonary:      Effort: Pulmonary effort is normal.   Musculoskeletal:         General: Tenderness (Right heel at insertion of plantar fascia to calcaneus.) present.      Comments: MMT 5/5 in all planes. Toe and ankle ROM intact and without pain.   Gastroc-soleal equinus.    Skin:     General: Skin is warm and dry.      Findings: No erythema or lesion.   Neurological:      General: No focal deficit present.      Mental Status: She is alert and oriented to person, place, and time.      Sensory: No sensory deficit.

## 2025-03-17 NOTE — PATIENT INSTRUCTIONS
Foot Pain Home Therapy    Stretch. Place painful foot in back with heel on ground and lean against wall. Do not lift heel off of ground. You will feel the stretch in the calf muscles and possibly the heel. Hold the stretch for 20-30 seconds. Do this at least 5-6 times per day. It is best done after exercise when your muscles are warm.  Wear supportive shoes at all times. Avoid flip-flops, flat sandals, barefoot walking (never walk barefoot, even at home). Generally avoid shoes that are too flexible and bend in the arch. Your shoes should only slightly bend in the toe area, not the middle. Running sneakers are often the best choice. Soft or no back may be beneficial when driving.   Supportive sneaker brands: Holley, On Cloud, Hoka, Altra, New Balance, Asics, Mizuno  [employer] [employer] (discount if mention Dr humberto)  F[facility]et Feet [facility] [place]   [employer] [place]  Supportive daily shoe brands: Vionic, Orthofeet, Diamond, Dansko, Chacos, Worley, Teva, Birkenstock  Supportive home shoes: Odilia Amaya (recovery slides)  Purchase over the counter topical pain creams such as Voltarin gel, biofreeze, or CBD cream - will need to apply 2-3 times per day for benefit. + deep tissue massage.   Look in to over the counter shoe inserts/arch supports such as Vasyli-Dananberg (remove tabs under 1st toe) arch supports. These are all available on Amazon.com as well as their individual website's.   Enpirion: Vasyli+Dananberg 1st Ray Orthotic, Medium, 1st Ray Function, Medium Density, Full-Length Insole, Heat Molding Optional, Best All Around Orthotic, Functional Biomechanical Control for Pain Relief, Black Yellow (64193) : Health & Household    VASYLI + Dananberg -- Vasyli Medical       Achilles Tendon Stretching Exercises    A) Standing Gastrocnemius stretch  Place hands on wall or chair. If using wall, put your hands at eye level.  Step the leg you want to stretch behind you. Keep your back heel on  the floor and point your toes straight ahead or slightly inward towards the heel of the opposite foot.   Bend your knee toward the wall while keeping your back leg straight.  Lean toward the wall until you feel a gentle stretch in you calf of the straight leg. Don't lean so far that you feel pain.  Hold for 15 seconds. Complete 3 reps.    B) Standing soleus stretch  Place your hands on the wall or chair. If using wall, put your hands at eye level.  Step the leg you want to stretch behind you (your back foot will need to be closer to the front foot than the above stretch). Keep your back heel on the floor and point your toes straight ahead or slightly inward towards the heel of the opposite foot.   Bend both your front and back knee at the same time (may help to stick your butt out). You do not need to lean towards the wall, just bend the knees.   Lean toward the wall until you feel a gentle stretch in you calf of the straight leg. Don't lean so far that you feel pain.  Hold for 15 seconds. Complete 3 reps.          Keep these tips and tricks in mind to get the most out of your stretching;  Take your time - move slowly, whether you are deepening into a stretch or changing positions. This will limit the risk of injury & discomfort.  Avoid bouncing - quick sudden movements will only worsen achilles tendon issues. Stay relaxed during stretch.  Keep your heel down and toes straight ahead or slightly inward - this will allow the achilles tendon to stretch properly.   Stop if you feel pain - Don't strain or force your muscle. If you feel sharp pain, stop immediately.

## 2025-03-21 LAB
APOB+LDLR+PCSK9 GENE MUT ANL BLD/T: NOT DETECTED
BRCA1+BRCA2 DEL+DUP + FULL MUT ANL BLD/T: NOT DETECTED
MLH1+MSH2+MSH6+PMS2 GN DEL+DUP+FUL M: NOT DETECTED

## 2025-04-04 DIAGNOSIS — R63.5 WEIGHT GAIN: ICD-10-CM

## 2025-04-04 RX ORDER — PHENTERMINE HYDROCHLORIDE 15 MG/1
15 CAPSULE ORAL EVERY MORNING
Qty: 30 CAPSULE | Refills: 0 | Status: SHIPPED | OUTPATIENT
Start: 2025-04-04

## 2025-04-04 RX ORDER — PHENTERMINE HYDROCHLORIDE 15 MG/1
15 CAPSULE ORAL EVERY MORNING
Qty: 30 CAPSULE | Refills: 0 | Status: CANCELLED | OUTPATIENT
Start: 2025-04-04

## 2025-04-04 NOTE — TELEPHONE ENCOUNTER
Medication:     phentermine 15 MG capsule     Dose/Frequency:   Take 1 capsule (15 mg total) by mouth every morning      Quantity: 30 capsules     Pharmacy: [id number] [place]     Office:   [x] PCP/Provider -   [] Speciality/Provider -     Does the patient have enough for 3 days?   [] Yes   [x] No - Send as HP to POD

## 2025-04-09 DIAGNOSIS — M79.89 SWELLING OF LOWER LEG: ICD-10-CM

## 2025-04-09 RX ORDER — FUROSEMIDE 20 MG/1
20 TABLET ORAL DAILY
Qty: 90 TABLET | Refills: 1 | Status: SHIPPED | OUTPATIENT
Start: 2025-04-09

## 2025-05-02 ENCOUNTER — OFFICE VISIT (OUTPATIENT)
Dept: PODIATRY | Age: 50
End: 2025-05-02
Payer: COMMERCIAL

## 2025-05-02 VITALS — HEIGHT: 64 IN | BODY MASS INDEX: 35.17 KG/M2 | WEIGHT: 206 LBS

## 2025-05-02 DIAGNOSIS — M79.671 PAIN OF RIGHT HEEL: Primary | ICD-10-CM

## 2025-05-02 PROCEDURE — 99213 OFFICE O/P EST LOW 20 MIN: CPT | Performed by: STUDENT IN AN ORGANIZED HEALTH CARE EDUCATION/TRAINING PROGRAM

## 2025-05-02 NOTE — PROGRESS NOTES
"Name: Arleth Shah      : 1975      MRN: [id number]  Encounter Provider: Alicia Rivera DPM  Encounter Date: 2025   Encounter department: [facility] PODIATRY [place]  :  Assessment & Plan  Pain of right heel    Orders:    Ambulatory Referral to Orthopedic Surgery; Future      Prior Imaging  XR right foot NWB 3v 24: No acute osseous abnormalities noted. Small plantar and posterior enthesophytes. TNJ arthritic changes. Exam limited given it is not WB.        PLAN:  Right foot pain with some improvement however still present. She did get 3wk improvement with CSI. I referred her to sports med for US-guided CSI.   C/w HEP/achilles stretching  C/w stiff bottomed sneakers/shoes (ex Asics, Vionic, New balance, Holley, etc) for daily use and Oofos, Hoka (recovery slides) for in home use.   C/w OTC Vasyli-Dananberg arch supports. CMOs rx'd via [facility] PT last appt with 1st ray cutout if covered by insurance  PT notes reviewed to date  RTC 2 mo (she will call me if pain returns after US-guided CSI for likely MRI)         History of Present Illness   HPI  Arleth Shah is a 50 y.o. female who presents to clinic for right foot pain f/u. Notes she got good relief for 2-3 wks after CSI but it came back after going to beach and not wearing her Hokas regularly. Denies back pain. Did get the inserts recommended which feel ok.     \"Notes pain has been improving slightly with Hoka sneakers and PT. The medrol dose pack significantly helped when taking but pain returned once she stopped. Did not get the arch supports. Denies back pain.\"     Initial HPI Pain present since 2024 without h/o trauma. Daily, worse at first step from rise and after long shifts at work. Does have recent weight gain. Had XR ordered per PCP. Works 12hrs on feet. Wearing non-supportive shoes today. Cannot take NSAIDs due to stomach ulcer.\"      Review of Systems   Constitutional:  Negative for activity change, chills and fever. " "  HENT: Negative.     Respiratory:  Negative for cough, chest tightness and shortness of breath.    Cardiovascular:  Negative for chest pain and leg swelling.   Endocrine: Negative.    Genitourinary: Negative.    Musculoskeletal:  Positive for gait problem.   Neurological:  Negative for numbness.   Psychiatric/Behavioral: Negative.  Negative for agitation and behavioral problems.           Objective   Ht 5' 4\" (1.626 m)   Wt 93.4 kg (206 lb)   LMP 05/22/2022 (Approximate)   BMI 35.36 kg/m²      Physical Exam  Constitutional:       General: She is not in acute distress.     Appearance: Normal appearance. She is not ill-appearing.   Cardiovascular:      Pulses: Normal pulses.   Pulmonary:      Effort: Pulmonary effort is normal.   Musculoskeletal:         General: Tenderness (Right heel at insertion of plantar fascia to calcaneus.) present.      Comments: MMT 5/5 in all planes. Toe and ankle ROM intact and without pain.   Gastroc-soleal equinus.    Skin:     General: Skin is warm and dry.      Findings: No erythema or lesion.   Neurological:      General: No focal deficit present.      Mental Status: She is alert and oriented to person, place, and time.      Sensory: No sensory deficit.           "

## 2025-05-07 ENCOUNTER — OFFICE VISIT (OUTPATIENT)
Dept: OBGYN CLINIC | Facility: CLINIC | Age: 50
End: 2025-05-07
Attending: STUDENT IN AN ORGANIZED HEALTH CARE EDUCATION/TRAINING PROGRAM
Payer: COMMERCIAL

## 2025-05-07 VITALS — HEIGHT: 64 IN | WEIGHT: 204.6 LBS | BODY MASS INDEX: 34.93 KG/M2

## 2025-05-07 DIAGNOSIS — M72.2 PLANTAR FASCIITIS, RIGHT: Primary | ICD-10-CM

## 2025-05-07 DIAGNOSIS — M79.671 PAIN OF RIGHT HEEL: ICD-10-CM

## 2025-05-07 PROCEDURE — 99243 OFF/OP CNSLTJ NEW/EST LOW 30: CPT | Performed by: STUDENT IN AN ORGANIZED HEALTH CARE EDUCATION/TRAINING PROGRAM

## 2025-05-07 PROCEDURE — 20550 NJX 1 TENDON SHEATH/LIGAMENT: CPT | Performed by: STUDENT IN AN ORGANIZED HEALTH CARE EDUCATION/TRAINING PROGRAM

## 2025-05-07 RX ADMIN — BUPIVACAINE HYDROCHLORIDE 0.5 ML: 2.5 INJECTION, SOLUTION INFILTRATION; PERINEURAL at 17:00

## 2025-05-07 RX ADMIN — TRIAMCINOLONE ACETONIDE 20 MG: 40 INJECTION, SUSPENSION INTRA-ARTICULAR; INTRAMUSCULAR at 17:00

## 2025-05-09 DIAGNOSIS — R63.5 WEIGHT GAIN: ICD-10-CM

## 2025-05-09 RX ORDER — PHENTERMINE HYDROCHLORIDE 30 MG/1
30 CAPSULE ORAL EVERY MORNING
Qty: 30 CAPSULE | Refills: 0 | Status: SHIPPED | OUTPATIENT
Start: 2025-05-09

## 2025-05-09 RX ORDER — BUPIVACAINE HYDROCHLORIDE 2.5 MG/ML
0.5 INJECTION, SOLUTION INFILTRATION; PERINEURAL
Status: COMPLETED | OUTPATIENT
Start: 2025-05-07 | End: 2025-05-07

## 2025-05-09 RX ORDER — TRIAMCINOLONE ACETONIDE 40 MG/ML
20 INJECTION, SUSPENSION INTRA-ARTICULAR; INTRAMUSCULAR
Status: COMPLETED | OUTPATIENT
Start: 2025-05-07 | End: 2025-05-07

## 2025-05-09 NOTE — PATIENT INSTRUCTIONS
"Patient Education     Steroid injection   The Basics   Written by the doctors and editors at [facility]   What is a steroid injection? -- Steroids, also known as \"glucocorticoids,\" are medicines that help reduce swelling and pain. Doctors sometimes inject a steroid medicine into a joint or other part of the body to relieve pain. This is also sometimes called a \"cortisone shot.\"  After the injection, the steroid starts to work within a few days.  How long does a steroid injection work? -- It depends on the person and where the injection is given. For some people, the effects of a steroid injection can last for a few weeks or longer.  Sometimes, the doctor also injects a medicine called a \"local anesthetic\" with the steroid. This might help relieve pain until the steroid starts to work.  A steroid injection can help with pain, but it won't cure the problem that is causing the pain.  How do I prepare for a steroid injection? -- The doctor or nurse will tell you if you need to do anything special to prepare. Before your procedure, your doctor will do an exam.  Your doctor will also ask you about your \"health history.\" This involves asking you questions about any health problems you have or had in the past, past surgeries, and any medicines you take. Tell them about:   Any medicines you are taking - This includes any prescription or \"over-the-counter\" medicines you use, plus any herbal supplements you take. It helps to write down and bring a list of any medicines you take, or bring a bag with all of your medicines with you.   Any allergies you have   Any bleeding problems you have   Any other steroid injections you have had  Ask the doctor or nurse if you have questions or if there is anything you do not understand.  How is a steroid injection given? -- When it is time for the injection:   The doctor will clean the skin over the area where they plan to give the shot. (This is called the \"injection site.\")   They might use " ultrasound or a special kind of X-ray during the procedure. This is to check where to give the steroid.   Sometimes, they might give a shot of medicine to numb the skin.   They will inject the steroid.   Then, they will take out the needle and cover the injection site with a bandage.  What happens after a steroid injection? -- You can go home after the injection.  For the next few days, you might want to:   Apply a cold gel pack, bag of ice, or bag of frozen vegetables to the injection site every 1 to 2 hours, for 15 minutes each time. Put a thin towel between the ice (or other cold object) and your skin.   Rest the treated body part for a few days.   Take medicines to relieve pain. Examples include acetaminophen (sample brand name: Tylenol), ibuprofen (sample brand names: Advil, Motrin), or naproxen (sample brand name: Aleve).  If you have diabetes, the doctor might want you to check your blood sugar levels more often for a few days. The steroid medicine might temporarily raise your blood sugar.  What are the risks of a steroid injection? -- Your doctor will talk to you about all of the possible risks, and answer your questions. Possible risks include:   Bleeding, bruising, or soreness at the injection site   Damage to parts near the injection site - This might include cartilage damage, injury to nerves, tendon rupture, or thinning of skin and bones.   Skin around the injection site becoming lighter or whiter in color   Infection   Health conditions like diabetes or high blood pressure getting worse for a few days   Allergic reaction to the medicine  What else should I know? -- Most of the time, doctors limit the total number of steroid injections to a certain area.  A steroid injection might be only 1 part of your treatment plan. Take your other medicines as instructed. Also, follow your doctor's recommendations about other treatments. These might include things like physical therapy or devices like a brace or  cane.  All topics are updated as new evidence becomes available and our peer review process is complete.  This topic retrieved from [employer] on: Apr 25, 2024.  Topic 273406 Version 2.0  Release: 32.4.2 - C32.114  © 2024 [employer] and/or its affiliates. All rights reserved.  Consumer Information Use and Disclaimer   Disclaimer: This generalized information is a limited summary of diagnosis, treatment, and/or medication information. It is not meant to be comprehensive and should be used as a tool to help the user understand and/or assess potential diagnostic and treatment options. It does NOT include all information about conditions, treatments, medications, side effects, or risks that may apply to a specific patient. It is not intended to be medical advice or a substitute for the medical advice, diagnosis, or treatment of a health care provider based on the health care provider's examination and assessment of a patient's specific and unique circumstances. Patients must speak with a health care provider for complete information about their health, medical questions, and treatment options, including any risks or benefits regarding use of medications. This information does not endorse any treatments or medications as safe, effective, or approved for treating a specific patient. UpToDate, Inc. and its affiliates disclaim any warranty or liability relating to this information or the use thereof.The use of this information is governed by the Terms of Use, available at https://www.woltersNetlogonuwer.com/en/know/clinical-effectiveness-terms. 2024© UpToDate, Inc. and its affiliates and/or licensors. All rights reserved.  Copyright   © 2024 UpToDate, Inc. and/or its affiliates. All rights reserved.

## 2025-05-09 NOTE — PROGRESS NOTES
Name: Arleth Shah      : 1975      MRN: [id number]  Encounter Provider: Lito Michel MD  Encounter Date: 2025   Encounter department: [facility] ORTHOP[facility] [place]      Assessment & Plan  Pain of right heel    Orders:    Ambulatory Referral to Orthopedic Surgery    Foot/lower extremity injection    Plantar fasciitis, right    Clinical exam and radiographic imaging reviewed with patient/parent today, with impression as per above. I have discussed with the patient the pathophysiology of this diagnosis and reviewed how the examination correlates with this diagnosis.    Imaging obtained/reviewed as per below.   Dr. Rivera's note reviewed from 5/3/2025  Treatment options were discussed at length, including risks and benefits; after discussing these treatment options, the patient elected to proceed with ultrasound-guided right plantar fascia origin cortisone injection as per procedure note below.  Counseled he can consider repeating the injection every 3 to 4 months as needed in regards to pain control.    Orders:    Foot/lower extremity injection         Return if symptoms worsen or fail to improve.    History of Present Illness       Chief Complaint   Patient presents with    Right Foot - Pain       HPI:  Arleth Shah is a 50 y.o. female  who presents for     Visit 2025:  Initial evaluation of right heel pain  Referred from Dr. Rivera at podiatry for ultrasound-guided right plantar fascia cortisone injection  Patient has undergone palpation guided CSI to the plantar fascia in the past with a few weeks of relief.  But pain slowly returned again.  She has been doing other conservative treatment including HEP, updated footwear and inserts.  They are here to for an ultrasound-guided injection to improve accuracy of cortisone injection.        Past Medical History:   Diagnosis Date    Colon polyp     Deep vein blood clot of left lower extremity (HCC) 2019    GERD (gastroesophageal reflux  "disease)        Past Surgical History:   Procedure Laterality Date    TUBAL LIGATION  1998     BREAST CYST ASPIRATION RIGHT INITIAL Right 07/11/2019       Current Outpatient Medications on File Prior to Visit   Medication Sig Dispense Refill    Alpha-D-Galactosidase (BEANO PO) Take by mouth      famotidine (PEPCID) 40 MG tablet TAKE 1 TABLET (40 MG TOTAL) BY MOUTH DAILY AT BEDTIME TAKE IN EVENING 2 HOURS PRIOR TO BED 90 tablet 1    fluticasone (FLONASE) 50 mcg/act nasal spray 1 spray into each nostril daily 9.9 mL 0    furosemide (LASIX) 20 mg tablet TAKE 1 TABLET BY MOUTH EVERY DAY 90 tablet 1    omeprazole (PriLOSEC) 20 mg delayed release capsule Take 1 capsule (20 mg total) by mouth 2 (two) times a day 30 minutes before breakfast and 30 minutes before dinner 180 capsule 1    phentermine 15 MG capsule Take 1 capsule (15 mg total) by mouth every morning 30 capsule 0    rivaroxaban (Xarelto) 15 mg tablet Take 1 tablet (15 mg total) by mouth daily with breakfast      methylPREDNISolone 4 MG tablet therapy pack Use as directed on package (Patient not taking: Reported on 3/17/2025) 1 each 0     Current Facility-Administered Medications on File Prior to Visit   Medication Dose Route Frequency Provider Last Rate Last Admin    betamethasone acetate-betamethasone sodium phosphate (CELESTONE) injection 0.5 mL  0.5 mL Infiltration     0.5 mL at 03/17/25 0800    lidocaine (XYLOCAINE) 1 % injection 0.5 mL  0.5 mL Infiltration     0.5 mL at 03/17/25 0800    triamcinolone acetonide (Kenalog-40) 40 mg/mL injection 0.5 mL  0.5 mL Infiltration     0.5 mL at 03/17/25 0800        Social History     Objective     Ht 5' 4\" (1.626 m)   Wt 92.8 kg (204 lb 9.6 oz)   LMP 05/22/2022 (Approximate)   BMI 35.12 kg/m²     Constitutional:  see vital signs  Gen: well-developed, normocephalic/atraumatic, well-groomed  Eyes: No inflammation or discharge of conjunctiva or lids; sclera clear   Pharynx: no inflammation, lesion, or mass of " lips  Neck: supple, no masses, non-distended  MSK: no inflammation, lesion, mass, or clubbing of nails and digits except for other than mentioned below  SKIN: no visible rashes or skin lesions  Pulmonary/Chest: Effort normal. No respiratory distress.      Lito Michel MD     Ortho Exam                Imaging Studies (I personally reviewed images in PACS and report):  X-ray right foot 12/23/2024: No acute osseous abnormalities.  Calcaneal enthesophyte.  Unremarkable soft tissues.    Foot/lower extremity injection    Performed by: Lito Michel MD  Authorized by: Lito Michel MD    Procedure:     Other Assisting Provider: No      Verbal consent obtained?: Yes      Risks and benefits: Risks, benefits and alternatives were discussed      Consent given by:  Patient    Patient states understanding of procedure being performed: Yes      Site marked: Yes      Radiology Images displayed and confirmed. If images not available, report reviewed: Yes      Required items: Required blood products, implants, devices and special equipment available      Supporting Documentation:     Indications:  Pain, diagnostic and therapeutic    Procedure Details:      Needle size: 25 G G     Ultrasound guidance: yes     Approach:  Medial    Laterality:  Right    Location: aponeurosis      Aponeurosis Structures: Plantar fascia origin      Injection Information:       Medications:  0.5 mL bupivacaine 0.25 %; 20 mg triamcinolone acetonide 40 mg/mL    Patient tolerance:  Patient tolerated the procedure well with no immediate complications    Dressing: sterile dressing applied      Comments:      Right ultrasound-guided plantar fascia injection:  Linear probe placed short axis to heel and plantar fascia with needle advanced long axis to probe to site of plantar fascia overlying calcaneus. No nerve, artery, or vein visualized in needle path.  Injection performed in short axis. Patient tolerated procedure well. Sterile bandage placed. Minimal  "to no bleeding.               -----------------------------------------------------------------  Portions of the record may have been created with voice recognition software. Occasional wrong word or \"sound a like\" substitutions may have occurred due to the inherent limitations of voice recognition software. Read the chart carefully and recognize, using context, where substitutions have occurred.     "

## 2025-06-04 ENCOUNTER — TELEPHONE (OUTPATIENT)
Dept: GASTROENTEROLOGY | Facility: CLINIC | Age: 50
End: 2025-06-04

## 2025-06-11 ENCOUNTER — OFFICE VISIT (OUTPATIENT)
Dept: GASTROENTEROLOGY | Facility: CLINIC | Age: 50
End: 2025-06-11
Payer: COMMERCIAL

## 2025-06-11 VITALS
HEIGHT: 64 IN | TEMPERATURE: 98.4 F | SYSTOLIC BLOOD PRESSURE: 122 MMHG | WEIGHT: 201 LBS | BODY MASS INDEX: 34.31 KG/M2 | DIASTOLIC BLOOD PRESSURE: 82 MMHG

## 2025-06-11 DIAGNOSIS — K31.7 GASTRIC POLYP: ICD-10-CM

## 2025-06-11 DIAGNOSIS — K63.5 SESSILE COLONIC POLYP: ICD-10-CM

## 2025-06-11 DIAGNOSIS — R14.0 BLOATING: Primary | ICD-10-CM

## 2025-06-11 PROCEDURE — 99214 OFFICE O/P EST MOD 30 MIN: CPT | Performed by: INTERNAL MEDICINE

## 2025-06-11 NOTE — PROGRESS NOTES
"Name: Arleth Shah      : 1975      MRN: [id number]  Encounter Provider: Leonard Blakely MD  Encounter Date: 2025   Encounter department: [facility] [place]  :  Assessment & Plan  Bloating  Ddx includes celiac disease, gastroparesis, SIBO. Will order serum celiac panel, nuclear gastric emptying study, SIBO testing.   Orders:    Celiac Panel/Adult; Future    NM gastric emptying; Future    Sessile colonic polyp  Hx c/w sessile serrated polyposis syndrome. Due for recall colonoscopy .        Gastric polyp  Hx of gastric hyperplastic polyposis and prior biopsy  w intestinal metaplasia not present on subsequent EGD. Due for recall EGD .            History of Present Illness   Arleth Shah is a 50 y.o. female w hx of bloating, colonic polyposis w suspicion for sessile serrated polyposis syndrome, GERD, intestinal metaplasia on prior EGD biopsies, gastric hyperplastic polyposis who presents for f/u.     Patient's main symptom today is persistent bloating.  She has tried over-the-counter Gas-X and Beano without significant relief, bloating happens postprandially very quickly with all foods.  Not worse with bread products.     Most recent EGD 2024, biopsies all normal.  Recommended for 3-year recall in setting of gastric hyperplastic polyps, and 3/2024 EGD showing intestinal metaplasia.  Most recent colonoscopy 2024 with multiple sessile polyps removed meeting criteria for sessile serrated polyposis syndrome.  Given 2-year recall for  colonoscopy.    Has never had gastric emptying study, SIBO testing.  Manages symptoms of GERD with famotidine and omeprazole.    HPI    Review of Systems A complete review of systems is negative other than that noted above in the HPI.      Current Medications[1]  Objective   /82 (BP Location: Left arm, Patient Position: Sitting, Cuff Size: Adult)   Temp 98.4 °F (36.9 °C) (Tympanic)   Ht 5' 4\" (1.626 m)   Wt 91.2 kg " (201 lb)   LMP 05/22/2022 (Approximate)   BMI 34.50 kg/m²     Physical Exam   General Appearance:   Alert, cooperative, no distress   HEENT:   Normocephalic, atraumatic, anicteric.     Neck:  Supple, symmetrical, trachea midline   Lungs:   No respiratory distress    Heart::   Regular rate; no murmur, rub, or gallop.   Abdomen:   Soft, non-tender, non-distended; no masses, no organomegaly    Neurologic:   Normal   Rectal:   Deferred    Extremities:  No cyanosis, clubbing or edema    Pulses:  2+ and symmetric    Skin:  No jaundice, rashes, or lesions    Lymph nodes:  No palpable cervical lymphadenopathy      Lab Results: I personally reviewed relevant lab results.       Results for orders placed during the hospital encounter of 08/07/24    Colonoscopy    Impression  Normal terminal ileum  4 mm sessile polyp cecum status post cold snare polypectomy  2 flat polyps measuring 4 to 6 mm proximal ascending colon status post cold snare polypectomy  2 scars noted in the ascending colon without any evidence of residual polyp tissue  6 flat polyps proximal transverse colon measuring 4 mm status post cold snare polypectomy  4 mm flat polyp at 40 cm status post cold snare polypectomy  5 mm flat polyp at 25 cm status post cold snare polypectomy  13 polyps from 20 cm through the rectum mostly measuring 4 mm to measuring 6 mm status post cold snare polypectomy.  Multiple but not all polyps removed from the distal 20 cm.  Moderate diverticulosis descending and sigmoid colon  All polyps appeared to be hyperplastic/serrated polyps    RECOMMENDATION:  Repeat colonoscopy in 2 years, due: 8/7/2026  Personal history of colon polyps and due to possible serrated polyposis syndrome    Suggest high-fiber diet, try obtain 25 to 30 g of fiber in diet daily.  Suggest the addition of a fiber supplement such as Benefiber 2 teaspoonfuls or Citrucel 1 heaping tablespoonful mixed in 6 to 8 ounce of non-carbonated liquid daily    Keep appointment  with Vivienne Fuentes in December.  -Please call the Gastroenterology office at [phone] for biopsy results if you do  not hear from our office in 14 days.  -Follow-up with primary care doctor as previously scheduled  -Please call gastroenterology physician on call or go to the emergency department if you develop abdominal pain, rectal bleeding greater than 1 tbsp, black stools, fever greater than 100.5, persistent nausea or vomiting.  Gastroenterology office phone number is [phone].    You could try using Gaviscon at bedtime to see if this helps with your morning symptoms.  Alternatively we could try Carafate liquid 1 g at bedtime to see if this helps.  If symptoms persist consider pH probe testing given persistent symptoms despite acid suppression.  Please see EGD discharge instructions        Panfilo Saba DO               [1]   Current Outpatient Medications   Medication Sig Dispense Refill    Alpha-D-Galactosidase (BEANO PO) Take by mouth      famotidine (PEPCID) 40 MG tablet TAKE 1 TABLET (40 MG TOTAL) BY MOUTH DAILY AT BEDTIME TAKE IN EVENING 2 HOURS PRIOR TO BED 90 tablet 1    fluticasone (FLONASE) 50 mcg/act nasal spray 1 spray into each nostril daily 9.9 mL 0    furosemide (LASIX) 20 mg tablet TAKE 1 TABLET BY MOUTH EVERY DAY 90 tablet 1    omeprazole (PriLOSEC) 20 mg delayed release capsule Take 1 capsule (20 mg total) by mouth 2 (two) times a day 30 minutes before breakfast and 30 minutes before dinner 180 capsule 1    phentermine 30 MG capsule Take 1 capsule (30 mg total) by mouth every morning 30 capsule 0    rivaroxaban (Xarelto) 15 mg tablet Take 1 tablet (15 mg total) by mouth daily with breakfast      methylPREDNISolone 4 MG tablet therapy pack Use as directed on package (Patient not taking: Reported on 3/17/2025) 1 each 0     Current Facility-Administered Medications   Medication Dose Route Frequency Provider Last Rate Last Admin    betamethasone acetate-betamethasone sodium  phosphate (CELESTONE) injection 0.5 mL  0.5 mL Infiltration     0.5 mL at 03/17/25 0800    lidocaine (XYLOCAINE) 1 % injection 0.5 mL  0.5 mL Infiltration     0.5 mL at 03/17/25 0800    triamcinolone acetonide (Kenalog-40) 40 mg/mL injection 0.5 mL  0.5 mL Infiltration     0.5 mL at 03/17/25 0800

## 2025-06-11 NOTE — PATIENT INSTRUCTIONS
Patient was given a sibo test to do as well as Labs.    Gastric emptying was scheduled in [place]  for 06/20/2025 at 7:30 am  Patient is on Xarelto prescribed by her PCP    Recall is in for a three month follow up

## 2025-06-20 ENCOUNTER — HOSPITAL ENCOUNTER (OUTPATIENT)
Dept: NUCLEAR MEDICINE | Facility: HOSPITAL | Age: 50
End: 2025-06-20
Attending: INTERNAL MEDICINE
Payer: COMMERCIAL

## 2025-06-20 DIAGNOSIS — R14.0 BLOATING: ICD-10-CM

## 2025-06-20 PROCEDURE — A9541 TC99M SULFUR COLLOID: HCPCS

## 2025-06-20 PROCEDURE — 78264 GASTRIC EMPTYING IMG STUDY: CPT

## 2025-06-23 ENCOUNTER — RESULTS FOLLOW-UP (OUTPATIENT)
Age: 50
End: 2025-06-23

## 2025-06-24 NOTE — TELEPHONE ENCOUNTER
PT returning call for a follow up in the next 1-2 months as per doctors orders.  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  Please assist PT with follow up appt.  Thanks  PT [phone]

## 2025-06-26 ENCOUNTER — APPOINTMENT (OUTPATIENT)
Dept: LAB | Facility: CLINIC | Age: 50
End: 2025-06-26
Payer: COMMERCIAL

## 2025-06-26 DIAGNOSIS — R14.0 BLOATING: ICD-10-CM

## 2025-06-26 LAB — IGA SERPL-MCNC: 260 MG/DL (ref 66–433)

## 2025-06-26 PROCEDURE — 86364 TISS TRNSGLTMNASE EA IG CLAS: CPT

## 2025-06-26 PROCEDURE — 36415 COLL VENOUS BLD VENIPUNCTURE: CPT

## 2025-06-26 PROCEDURE — 82784 ASSAY IGA/IGD/IGG/IGM EACH: CPT

## 2025-06-27 ENCOUNTER — TELEPHONE (OUTPATIENT)
Dept: GASTROENTEROLOGY | Facility: CLINIC | Age: 50
End: 2025-06-27

## 2025-06-27 DIAGNOSIS — K22.10 EROSIVE ESOPHAGITIS: ICD-10-CM

## 2025-06-27 DIAGNOSIS — K25.9 GASTRIC ULCER WITHOUT HEMORRHAGE OR PERFORATION, UNSPECIFIED CHRONICITY: ICD-10-CM

## 2025-06-27 NOTE — TELEPHONE ENCOUNTER
Patient dropped off SIBO test. Sent it to [facility] for evaluation. Patient also schedule 3M F/U with Dr. Meng on 9/16/25 at 1:00 PM in [facility] (Dr. [facility] in [facility]). Pt conf 3M f/u appnt.

## 2025-06-30 RX ORDER — OMEPRAZOLE 20 MG/1
CAPSULE, DELAYED RELEASE ORAL
Qty: 180 CAPSULE | Refills: 1 | Status: SHIPPED | OUTPATIENT
Start: 2025-06-30

## 2025-06-30 RX ORDER — OMEPRAZOLE 20 MG/1
20 CAPSULE, DELAYED RELEASE ORAL 2 TIMES DAILY
Qty: 180 CAPSULE | Refills: 0 | OUTPATIENT
Start: 2025-06-30

## 2025-07-03 ENCOUNTER — OFFICE VISIT (OUTPATIENT)
Dept: PODIATRY | Age: 50
End: 2025-07-03
Payer: COMMERCIAL

## 2025-07-03 VITALS — WEIGHT: 206.8 LBS | BODY MASS INDEX: 35.3 KG/M2 | HEIGHT: 64 IN

## 2025-07-03 DIAGNOSIS — M79.671 PAIN OF RIGHT HEEL: Primary | ICD-10-CM

## 2025-07-03 PROCEDURE — 99213 OFFICE O/P EST LOW 20 MIN: CPT | Performed by: STUDENT IN AN ORGANIZED HEALTH CARE EDUCATION/TRAINING PROGRAM

## 2025-07-03 NOTE — ASSESSMENT & PLAN NOTE
Right foot pain doing very well and nearly resolved. C/w recs as below, can get repeat US-guided CSI per sports med prn per their protocol   Sports med note from 5/7/25 reviewed for US-guided CSI.   C/w HEP/achilles stretching  C/w stiff bottomed sneakers/shoes (ex Asics, Vionic, New balance, Holley, etc) for daily use and Odilia Amaya (recovery slides) for in home use.   C/w OTC Vasyli-Dananberg arch supports. CMOs not covered by insurance  RTC prn if pain does not fully resolve, will need MRI

## 2025-07-03 NOTE — PROGRESS NOTES
"Name: Arleth Shah      : 1975      MRN: [id number]  Encounter Provider: Alicia Rivera DPM  Encounter Date: 7/3/2025   Encounter department: [facility] [place]  :  Assessment & Plan  Pain of right heel  Right foot pain doing very well and nearly resolved. C/w recs as below, can get repeat US-guided CSI per sports med prn per their protocol   Sports med note from 25 reviewed for US-guided CSI.   C/w HEP/achilles stretching  C/w stiff bottomed sneakers/shoes (ex Asics, Vionic, New balance, Holley, etc) for daily use and Oofos, Hoka (recovery slides) for in home use.   C/w OTC Vasyli-Dananberg arch supports. CMOs not covered by insurance  RTC prn if pain does not fully resolve, will need MRI       Prior Imaging  XR right foot NWB 3v 24: No acute osseous abnormalities noted. Small plantar and posterior enthesophytes. TNJ arthritic changes. Exam limited given it is not WB.         History of Present Illness   HPI  Arleth Shah is a 50 y.o. female who presents right foot pain f/u. Had US-guided CSI per sports med.     \"Notes she got good relief for 2-3 wks after CSI but it came back after going to beach and not wearing her Hokas regularly. Denies back pain. Did get the inserts recommended which feel ok.\"      \"Notes pain has been improving slightly with Hoka sneakers and PT. The medrol dose pack significantly helped when taking but pain returned once she stopped. Did not get the arch supports. Denies back pain.\"      Initial HPI Pain present since 2024 without h/o trauma. Daily, worse at first step from rise and after long shifts at work. Does have recent weight gain. Had XR ordered per PCP. Works 12hrs on feet. Wearing non-supportive shoes today. Cannot take NSAIDs due to stomach ulcer.\"         Review of Systems   Constitutional:  Negative for activity change, chills and fever.   HENT: Negative.     Respiratory:  Negative for cough, chest tightness and shortness of " "breath.    Cardiovascular:  Negative for chest pain and leg swelling.   Endocrine: Negative.    Genitourinary: Negative.    Musculoskeletal:  Negative for gait problem.   Neurological:  Negative for numbness.   Psychiatric/Behavioral: Negative.  Negative for agitation and behavioral problems.           Objective   Ht 5' 4\" (1.626 m)   Wt 93.8 kg (206 lb 12.8 oz)   LMP 05/22/2022 (Approximate)   BMI 35.50 kg/m²      Physical Exam  Constitutional:       General: She is not in acute distress.     Appearance: Normal appearance. She is not ill-appearing.     Cardiovascular:      Pulses: Normal pulses.   Pulmonary:      Effort: Pulmonary effort is normal.     Musculoskeletal:         General: No tenderness (Right heel at insertion of plantar fascia to calcaneus resolved on exam today).      Comments: MMT 5/5 in all planes. Toe and ankle ROM intact and without pain.   Gastroc-soleal equinus.      Skin:     General: Skin is warm and dry.      Findings: No erythema or lesion.     Neurological:      General: No focal deficit present.      Mental Status: She is alert and oriented to person, place, and time.      Sensory: No sensory deficit.           "

## 2025-07-08 ENCOUNTER — OFFICE VISIT (OUTPATIENT)
Dept: GASTROENTEROLOGY | Facility: CLINIC | Age: 50
End: 2025-07-08
Payer: COMMERCIAL

## 2025-07-08 DIAGNOSIS — R14.0 BLOATING: Primary | ICD-10-CM

## 2025-07-08 PROCEDURE — PBNCHG PB NO CHARGE PLACEHOLDER: Performed by: INTERNAL MEDICINE

## 2025-07-08 PROCEDURE — 91065 BREATH HYDROGEN/METHANE TEST: CPT | Performed by: INTERNAL MEDICINE

## 2025-07-08 NOTE — PROGRESS NOTES
[facility]       Bacterial Overgrowth Analytical Record    Arleth Shah 50 y.o. female MRN: [id number]      Date of Test: 6/27/25    Substrate Given: Lactulose    Ordering Provider: Dr. Blakely      Medical Assistant: Da    Symptoms: bloating    The patient presents for bacterial overgrowth testing.    Patient fasted overnight. Baseline readings obtained.   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  Defer management plan to patient's GI provider Dr. Blakely.

## 2025-07-10 LAB — TTG IGA SER IA-ACNC: 0.7 U/ML (ref ?–10)

## 2025-07-11 DIAGNOSIS — I82.402 DEEP VEIN THROMBOSIS (DVT) OF LEFT LOWER EXTREMITY, UNSPECIFIED CHRONICITY, UNSPECIFIED VEIN (HCC): ICD-10-CM

## 2025-07-14 RX ORDER — RIVAROXABAN 15 MG/1
15 TABLET, FILM COATED ORAL
Qty: 30 TABLET | Refills: 0 | Status: SHIPPED | OUTPATIENT
Start: 2025-07-14

## 2025-08-07 ENCOUNTER — RESULTS FOLLOW-UP (OUTPATIENT)
Dept: GASTROENTEROLOGY | Facility: CLINIC | Age: 50
End: 2025-08-07

## 2025-08-07 DIAGNOSIS — K63.8219 SMALL INTESTINAL BACTERIAL OVERGROWTH (SIBO): Primary | ICD-10-CM

## 2025-08-07 DIAGNOSIS — K63.829 INTESTINAL METHANOGEN OVERGROWTH: ICD-10-CM

## 2025-08-07 RX ORDER — NEOMYCIN SULFATE 500 MG/1
500 TABLET ORAL 2 TIMES DAILY
Qty: 28 TABLET | Refills: 0 | Status: SHIPPED | OUTPATIENT
Start: 2025-08-07 | End: 2025-08-21